# Patient Record
Sex: MALE | Race: BLACK OR AFRICAN AMERICAN | Employment: FULL TIME | URBAN - METROPOLITAN AREA
[De-identification: names, ages, dates, MRNs, and addresses within clinical notes are randomized per-mention and may not be internally consistent; named-entity substitution may affect disease eponyms.]

---

## 2019-09-24 PROBLEM — E66.01 OBESITY, MORBID (HCC): Status: ACTIVE | Noted: 2019-09-24

## 2020-07-09 ENCOUNTER — HOSPITAL ENCOUNTER (INPATIENT)
Age: 30
LOS: 2 days | Discharge: HOME | DRG: 871 | End: 2020-07-11
Attending: INTERNAL MEDICINE | Admitting: INTERNAL MEDICINE
Payer: COMMERCIAL

## 2020-07-09 ENCOUNTER — HOSPITAL ENCOUNTER (EMERGENCY)
Age: 30
Discharge: ANOTHER HOSPITAL | End: 2020-07-09
Attending: EMERGENCY MEDICINE
Payer: COMMERCIAL

## 2020-07-09 ENCOUNTER — APPOINTMENT (OUTPATIENT)
Dept: GENERAL RADIOLOGY | Age: 30
End: 2020-07-09
Attending: EMERGENCY MEDICINE
Payer: COMMERCIAL

## 2020-07-09 VITALS
DIASTOLIC BLOOD PRESSURE: 81 MMHG | RESPIRATION RATE: 30 BRPM | HEART RATE: 114 BPM | BODY MASS INDEX: 43.16 KG/M2 | WEIGHT: 275 LBS | HEIGHT: 67 IN | SYSTOLIC BLOOD PRESSURE: 138 MMHG | OXYGEN SATURATION: 93 % | TEMPERATURE: 99.9 F

## 2020-07-09 DIAGNOSIS — U07.1 COVID-19: Primary | ICD-10-CM

## 2020-07-09 PROBLEM — A41.9 SEPSIS (HCC): Status: ACTIVE | Noted: 2020-07-09

## 2020-07-09 PROBLEM — E11.9 DM (DIABETES MELLITUS) (HCC): Status: ACTIVE | Noted: 2020-07-09

## 2020-07-09 PROBLEM — R07.89 ATYPICAL CHEST PAIN: Status: ACTIVE | Noted: 2020-07-09

## 2020-07-09 PROBLEM — R00.0 SINUS TACHYCARDIA: Status: ACTIVE | Noted: 2020-07-09

## 2020-07-09 PROBLEM — I10 HTN (HYPERTENSION): Status: ACTIVE | Noted: 2020-07-09

## 2020-07-09 PROBLEM — J12.82 PNEUMONIA DUE TO COVID-19 VIRUS: Status: ACTIVE | Noted: 2020-07-09

## 2020-07-09 LAB
ABO + RH BLD: NORMAL
ALBUMIN SERPL-MCNC: 3.4 G/DL (ref 3.5–5)
ALBUMIN/GLOB SERPL: 0.8 {RATIO} (ref 1.2–3.5)
ALP SERPL-CCNC: 57 U/L (ref 50–136)
ALT SERPL-CCNC: 31 U/L (ref 12–65)
ANION GAP SERPL CALC-SCNC: 7 MMOL/L (ref 7–16)
AST SERPL-CCNC: 27 U/L (ref 15–37)
ATRIAL RATE: 107 BPM
BACTERIA URNS QL MICRO: 0 /HPF
BASOPHILS # BLD: 0 K/UL (ref 0–0.2)
BASOPHILS NFR BLD: 0 % (ref 0–2)
BILIRUB SERPL-MCNC: 0.8 MG/DL (ref 0.2–1.1)
BLOOD GROUP ANTIBODIES SERPL: NORMAL
BUN SERPL-MCNC: 13 MG/DL (ref 6–23)
CALCIUM SERPL-MCNC: 8.6 MG/DL (ref 8.3–10.4)
CALCULATED P AXIS, ECG09: 36 DEGREES
CALCULATED R AXIS, ECG10: 121 DEGREES
CALCULATED T AXIS, ECG11: 42 DEGREES
CASTS URNS QL MICRO: 0 /LPF
CHLORIDE SERPL-SCNC: 102 MMOL/L (ref 98–107)
CO2 SERPL-SCNC: 26 MMOL/L (ref 21–32)
CREAT SERPL-MCNC: 1.12 MG/DL (ref 0.8–1.5)
DIAGNOSIS, 93000: NORMAL
DIFFERENTIAL METHOD BLD: ABNORMAL
EOSINOPHIL # BLD: 0 K/UL (ref 0–0.8)
EOSINOPHIL NFR BLD: 0 % (ref 0.5–7.8)
EPI CELLS #/AREA URNS HPF: NORMAL /HPF
ERYTHROCYTE [DISTWIDTH] IN BLOOD BY AUTOMATED COUNT: 13.6 % (ref 11.9–14.6)
GLOBULIN SER CALC-MCNC: 4.4 G/DL (ref 2.3–3.5)
GLUCOSE BLD STRIP.AUTO-MCNC: 134 MG/DL (ref 65–100)
GLUCOSE BLD STRIP.AUTO-MCNC: 175 MG/DL (ref 65–100)
GLUCOSE SERPL-MCNC: 112 MG/DL (ref 65–100)
HCT VFR BLD AUTO: 48.1 % (ref 41.1–50.3)
HGB BLD-MCNC: 15.9 G/DL (ref 13.6–17.2)
IMM GRANULOCYTES # BLD AUTO: 0 K/UL (ref 0–0.5)
IMM GRANULOCYTES NFR BLD AUTO: 0 % (ref 0–5)
LACTATE SERPL-SCNC: 1 MMOL/L (ref 0.4–2)
LYMPHOCYTES # BLD: 1.3 K/UL (ref 0.5–4.6)
LYMPHOCYTES NFR BLD: 26 % (ref 13–44)
MCH RBC QN AUTO: 27.8 PG (ref 26.1–32.9)
MCHC RBC AUTO-ENTMCNC: 33.1 G/DL (ref 31.4–35)
MCV RBC AUTO: 84.1 FL (ref 79.6–97.8)
MONOCYTES # BLD: 0.3 K/UL (ref 0.1–1.3)
MONOCYTES NFR BLD: 7 % (ref 4–12)
NEUTS SEG # BLD: 3.4 K/UL (ref 1.7–8.2)
NEUTS SEG NFR BLD: 67 % (ref 43–78)
NRBC # BLD: 0 K/UL (ref 0–0.2)
P-R INTERVAL, ECG05: 150 MS
PLATELET # BLD AUTO: 164 K/UL (ref 150–450)
PMV BLD AUTO: 10.5 FL (ref 9.4–12.3)
POTASSIUM SERPL-SCNC: 4 MMOL/L (ref 3.5–5.1)
PROT SERPL-MCNC: 7.8 G/DL (ref 6.3–8.2)
Q-T INTERVAL, ECG07: 318 MS
QRS DURATION, ECG06: 80 MS
QTC CALCULATION (BEZET), ECG08: 424 MS
RBC # BLD AUTO: 5.72 M/UL (ref 4.23–5.6)
RBC #/AREA URNS HPF: NORMAL /HPF
SODIUM SERPL-SCNC: 135 MMOL/L (ref 136–145)
SPECIMEN EXP DATE BLD: NORMAL
VENTRICULAR RATE, ECG03: 107 BPM
WBC # BLD AUTO: 5.1 K/UL (ref 4.3–11.1)
WBC URNS QL MICRO: NORMAL /HPF

## 2020-07-09 PROCEDURE — 80053 COMPREHEN METABOLIC PANEL: CPT

## 2020-07-09 PROCEDURE — 87040 BLOOD CULTURE FOR BACTERIA: CPT

## 2020-07-09 PROCEDURE — 74011636637 HC RX REV CODE- 636/637: Performed by: INTERNAL MEDICINE

## 2020-07-09 PROCEDURE — 71045 X-RAY EXAM CHEST 1 VIEW: CPT

## 2020-07-09 PROCEDURE — 74011000258 HC RX REV CODE- 258: Performed by: INTERNAL MEDICINE

## 2020-07-09 PROCEDURE — 82962 GLUCOSE BLOOD TEST: CPT

## 2020-07-09 PROCEDURE — 30233K1 TRANSFUSION OF NONAUTOLOGOUS FROZEN PLASMA INTO PERIPHERAL VEIN, PERCUTANEOUS APPROACH: ICD-10-PCS | Performed by: INTERNAL MEDICINE

## 2020-07-09 PROCEDURE — 65270000029 HC RM PRIVATE

## 2020-07-09 PROCEDURE — 74011250636 HC RX REV CODE- 250/636: Performed by: INTERNAL MEDICINE

## 2020-07-09 PROCEDURE — 83605 ASSAY OF LACTIC ACID: CPT

## 2020-07-09 PROCEDURE — 74011250637 HC RX REV CODE- 250/637: Performed by: EMERGENCY MEDICINE

## 2020-07-09 PROCEDURE — 96375 TX/PRO/DX INJ NEW DRUG ADDON: CPT

## 2020-07-09 PROCEDURE — 74011250637 HC RX REV CODE- 250/637: Performed by: INTERNAL MEDICINE

## 2020-07-09 PROCEDURE — 99285 EMERGENCY DEPT VISIT HI MDM: CPT

## 2020-07-09 PROCEDURE — 74011000258 HC RX REV CODE- 258: Performed by: EMERGENCY MEDICINE

## 2020-07-09 PROCEDURE — 86900 BLOOD TYPING SEROLOGIC ABO: CPT

## 2020-07-09 PROCEDURE — 93005 ELECTROCARDIOGRAM TRACING: CPT | Performed by: EMERGENCY MEDICINE

## 2020-07-09 PROCEDURE — 74011250637 HC RX REV CODE- 250/637: Performed by: FAMILY MEDICINE

## 2020-07-09 PROCEDURE — 96365 THER/PROPH/DIAG IV INF INIT: CPT

## 2020-07-09 PROCEDURE — 36415 COLL VENOUS BLD VENIPUNCTURE: CPT

## 2020-07-09 PROCEDURE — 85025 COMPLETE CBC W/AUTO DIFF WBC: CPT

## 2020-07-09 PROCEDURE — 74011250636 HC RX REV CODE- 250/636: Performed by: EMERGENCY MEDICINE

## 2020-07-09 PROCEDURE — 81015 MICROSCOPIC EXAM OF URINE: CPT

## 2020-07-09 RX ORDER — INSULIN LISPRO 100 [IU]/ML
INJECTION, SOLUTION INTRAVENOUS; SUBCUTANEOUS
Status: DISCONTINUED | OUTPATIENT
Start: 2020-07-09 | End: 2020-07-11 | Stop reason: HOSPADM

## 2020-07-09 RX ORDER — INSULIN LISPRO 100 [IU]/ML
INJECTION, SOLUTION INTRAVENOUS; SUBCUTANEOUS
Status: CANCELLED | OUTPATIENT
Start: 2020-07-09

## 2020-07-09 RX ORDER — SODIUM CHLORIDE 0.9 % (FLUSH) 0.9 %
5-40 SYRINGE (ML) INJECTION AS NEEDED
Status: CANCELLED | OUTPATIENT
Start: 2020-07-09

## 2020-07-09 RX ORDER — SODIUM CHLORIDE 9 MG/ML
250 INJECTION, SOLUTION INTRAVENOUS AS NEEDED
Status: DISCONTINUED | OUTPATIENT
Start: 2020-07-09 | End: 2020-07-11 | Stop reason: HOSPADM

## 2020-07-09 RX ORDER — ONDANSETRON 2 MG/ML
4 INJECTION INTRAMUSCULAR; INTRAVENOUS
Status: DISCONTINUED | OUTPATIENT
Start: 2020-07-09 | End: 2020-07-11 | Stop reason: HOSPADM

## 2020-07-09 RX ORDER — AZITHROMYCIN 250 MG/1
250 TABLET, FILM COATED ORAL DAILY
Status: DISCONTINUED | OUTPATIENT
Start: 2020-07-10 | End: 2020-07-10

## 2020-07-09 RX ORDER — SODIUM CHLORIDE 0.9 % (FLUSH) 0.9 %
5-40 SYRINGE (ML) INJECTION AS NEEDED
Status: DISCONTINUED | OUTPATIENT
Start: 2020-07-09 | End: 2020-07-09 | Stop reason: HOSPADM

## 2020-07-09 RX ORDER — LOSARTAN POTASSIUM 25 MG/1
25 TABLET ORAL DAILY
Status: CANCELLED | OUTPATIENT
Start: 2020-07-10

## 2020-07-09 RX ORDER — ACETAMINOPHEN 500 MG
1000 TABLET ORAL
Status: COMPLETED | OUTPATIENT
Start: 2020-07-09 | End: 2020-07-09

## 2020-07-09 RX ORDER — SODIUM CHLORIDE 0.9 % (FLUSH) 0.9 %
5-40 SYRINGE (ML) INJECTION EVERY 8 HOURS
Status: CANCELLED | OUTPATIENT
Start: 2020-07-09

## 2020-07-09 RX ORDER — ACETAMINOPHEN 325 MG/1
650 TABLET ORAL
Status: DISCONTINUED | OUTPATIENT
Start: 2020-07-09 | End: 2020-07-11 | Stop reason: HOSPADM

## 2020-07-09 RX ORDER — HYDROCHLOROTHIAZIDE 25 MG/1
25 TABLET ORAL DAILY
Status: CANCELLED | OUTPATIENT
Start: 2020-07-10

## 2020-07-09 RX ORDER — PANTOPRAZOLE SODIUM 40 MG/1
40 TABLET, DELAYED RELEASE ORAL
Status: DISCONTINUED | OUTPATIENT
Start: 2020-07-10 | End: 2020-07-11 | Stop reason: HOSPADM

## 2020-07-09 RX ORDER — ENOXAPARIN SODIUM 100 MG/ML
40 INJECTION SUBCUTANEOUS EVERY 12 HOURS
Status: DISCONTINUED | OUTPATIENT
Start: 2020-07-09 | End: 2020-07-11 | Stop reason: HOSPADM

## 2020-07-09 RX ORDER — DEXAMETHASONE SODIUM PHOSPHATE 100 MG/10ML
6 INJECTION INTRAMUSCULAR; INTRAVENOUS DAILY
Status: CANCELLED | OUTPATIENT
Start: 2020-07-10 | End: 2020-07-19

## 2020-07-09 RX ORDER — ONDANSETRON 2 MG/ML
4 INJECTION INTRAMUSCULAR; INTRAVENOUS
Status: CANCELLED | OUTPATIENT
Start: 2020-07-09

## 2020-07-09 RX ORDER — AZITHROMYCIN 250 MG/1
500 TABLET, FILM COATED ORAL DAILY
Status: CANCELLED | OUTPATIENT
Start: 2020-07-10

## 2020-07-09 RX ORDER — PANTOPRAZOLE SODIUM 40 MG/1
40 TABLET, DELAYED RELEASE ORAL
Status: CANCELLED | OUTPATIENT
Start: 2020-07-10

## 2020-07-09 RX ORDER — SODIUM CHLORIDE 9 MG/ML
250 INJECTION, SOLUTION INTRAVENOUS AS NEEDED
Status: CANCELLED | OUTPATIENT
Start: 2020-07-09

## 2020-07-09 RX ORDER — PANTOPRAZOLE SODIUM 40 MG/1
40 TABLET, DELAYED RELEASE ORAL
Status: DISCONTINUED | OUTPATIENT
Start: 2020-07-10 | End: 2020-07-09 | Stop reason: HOSPADM

## 2020-07-09 RX ORDER — DEXAMETHASONE SODIUM PHOSPHATE 100 MG/10ML
6 INJECTION INTRAMUSCULAR; INTRAVENOUS DAILY
Status: DISCONTINUED | OUTPATIENT
Start: 2020-07-09 | End: 2020-07-09 | Stop reason: HOSPADM

## 2020-07-09 RX ORDER — GUAIFENESIN 600 MG/1
600 TABLET, EXTENDED RELEASE ORAL EVERY 12 HOURS
Status: DISCONTINUED | OUTPATIENT
Start: 2020-07-09 | End: 2020-07-11 | Stop reason: HOSPADM

## 2020-07-09 RX ORDER — SODIUM CHLORIDE 0.9 % (FLUSH) 0.9 %
5-40 SYRINGE (ML) INJECTION EVERY 8 HOURS
Status: DISCONTINUED | OUTPATIENT
Start: 2020-07-09 | End: 2020-07-11 | Stop reason: HOSPADM

## 2020-07-09 RX ORDER — METFORMIN HYDROCHLORIDE 500 MG/1
500 TABLET ORAL
COMMUNITY
End: 2021-03-04 | Stop reason: SDUPTHER

## 2020-07-09 RX ORDER — SODIUM CHLORIDE 0.9 % (FLUSH) 0.9 %
5-40 SYRINGE (ML) INJECTION EVERY 8 HOURS
Status: DISCONTINUED | OUTPATIENT
Start: 2020-07-09 | End: 2020-07-09 | Stop reason: HOSPADM

## 2020-07-09 RX ORDER — DEXAMETHASONE SODIUM PHOSPHATE 100 MG/10ML
6 INJECTION INTRAMUSCULAR; INTRAVENOUS DAILY
Status: DISCONTINUED | OUTPATIENT
Start: 2020-07-10 | End: 2020-07-11 | Stop reason: DRUGHIGH

## 2020-07-09 RX ORDER — ENOXAPARIN SODIUM 100 MG/ML
40 INJECTION SUBCUTANEOUS EVERY 24 HOURS
Status: CANCELLED | OUTPATIENT
Start: 2020-07-09

## 2020-07-09 RX ORDER — SODIUM CHLORIDE 0.9 % (FLUSH) 0.9 %
5-40 SYRINGE (ML) INJECTION AS NEEDED
Status: DISCONTINUED | OUTPATIENT
Start: 2020-07-09 | End: 2020-07-11 | Stop reason: HOSPADM

## 2020-07-09 RX ORDER — HYDROCODONE BITARTRATE AND HOMATROPINE METHYLBROMIDE 1.5; 5 MG/5ML; MG/5ML
5 SYRUP ORAL
Status: DISCONTINUED | OUTPATIENT
Start: 2020-07-09 | End: 2020-07-11 | Stop reason: HOSPADM

## 2020-07-09 RX ADMIN — INSULIN LISPRO 2 UNITS: 100 INJECTION, SOLUTION INTRAVENOUS; SUBCUTANEOUS at 22:23

## 2020-07-09 RX ADMIN — ACETAMINOPHEN 650 MG: 325 TABLET, FILM COATED ORAL at 22:23

## 2020-07-09 RX ADMIN — ENOXAPARIN SODIUM 40 MG: 40 INJECTION SUBCUTANEOUS at 14:18

## 2020-07-09 RX ADMIN — CEFTRIAXONE 1 G: 1 INJECTION, POWDER, FOR SOLUTION INTRAMUSCULAR; INTRAVENOUS at 10:18

## 2020-07-09 RX ADMIN — CEFTRIAXONE SODIUM 1 G: 1 INJECTION, POWDER, FOR SOLUTION INTRAMUSCULAR; INTRAVENOUS at 14:18

## 2020-07-09 RX ADMIN — Medication 5 ML: at 23:51

## 2020-07-09 RX ADMIN — HYDROCODONE BITARTRATE AND HOMATROPINE METHYLBROMIDE 5 ML: 5; 1.5 SOLUTION ORAL at 23:51

## 2020-07-09 RX ADMIN — AZITHROMYCIN MONOHYDRATE 500 MG: 500 INJECTION, POWDER, LYOPHILIZED, FOR SOLUTION INTRAVENOUS at 10:24

## 2020-07-09 RX ADMIN — Medication 10 ML: at 14:18

## 2020-07-09 RX ADMIN — ACETAMINOPHEN 1000 MG: 500 TABLET, FILM COATED ORAL at 10:17

## 2020-07-09 RX ADMIN — DEXAMETHASONE SODIUM PHOSPHATE 6 MG: 10 INJECTION INTRAMUSCULAR; INTRAVENOUS at 10:39

## 2020-07-09 NOTE — DISCHARGE SUMMARY
Hospitalist Discharge Summary     Patient ID:  Jayde Marshall  [id number]  6 y.o.  1990  Admit date: 7/9/2020  8:57 AM  Discharge date and time: 7/9/2020  Attending: Ani Doyle MD  PCP:  Fadia Rees NP  Treatment Team: Attending Provider: Ani Doyle MD; Primary Nurse: Nelson Hatfield RN    Principal Diagnosis <principal problem not specified>   Active Problems:    HTN (hypertension) (7/9/2020)      DM (diabetes mellitus) ([address] (7/9/2020)      COVID-19 (7/9/2020)      Sinus tachycardia (7/9/2020)      Atypical chest pain (7/9/2020)      Sepsis ([address] (7/9/2020)       31 yo AAM with past history of HTN and NIDDM type II presents with worsening shortness of breath and fevers/chills that started on Tuesday. He was recently diagnosed with COVID-19 on July 3 at [facility]. He has not had these current symptoms when he was first diagnosed. He does have some chest pain \"but it's mainly from coughing. \" He does have shortness of breath that is worse when he moves around. He denies abdominal pain, nausea/vomiting, or diarrhea. Patient works as a [employer]  and says that several of his co-workers have also developed COVID-19.     ED Course: patient with HRs ranging from 130-150, exacerbated by marching in place with drop in O2 sats to upper 80s on RA when moving around. Blood cultures collected and given empiric Rocephin/azithro for CAP coverage. Hospitalist consulted for admission. Hospital Course:  Please refer to the admission H&P for details of presentation. In summary, the patient is being admitted for sepsis secondary to COVID-19. He will be transferred DT to [facility]. Please see H&P for further details.      Significant Diagnostic Studies:       Labs: Results:       Chemistry Recent Labs     07/09/20  1009   *   *   K 4.0      CO2 26   BUN 13   CREA 1.12   CA 8.6   AGAP 7   AP 57   TP 7.8   ALB 3.4*   GLOB 4.4*   AGRAT 0.8*      CBC w/Diff Recent Labs 07/09/20  1009   WBC 5.1   RBC 5.72*   HGB 15.9   HCT 48.1      GRANS 67   LYMPH 26   EOS 0*      Cardiac Enzymes No results for input(s): CPK, CKND1, OLEG in the last 72 hours. No lab exists for component: CKRMB, TROIP   Coagulation No results for input(s): PTP, INR, APTT, INREXT in the last 72 hours. Lipid Panel No results found for: CHOL, CHOLPOCT, CHOLX, CHLST, CHOLV, 253440, HDL, HDLP, LDL, LDLC, DLDLP, 636605, VLDLC, VLDL, TGLX, TRIGL, TRIGP, TGLPOCT, CHHD, CHHDX   BNP No results for input(s): BNPP in the last 72 hours. Liver Enzymes Recent Labs     07/09/20  1009   TP 7.8   ALB 3.4*   AP 57      Thyroid Studies Lab Results   Component Value Date/Time    TSH 0.961 09/24/2019 10:25 AM            Discharge Exam:  Visit Vitals  BP (!) 169/99 (BP 1 Location: Right arm, BP Patient Position: At rest)   Pulse (!) 120   Temp (!) 100.9 °F (38.3 °C)   Resp 26   Ht 5' 7\" (1.702 m)   Wt 124.7 kg (275 lb)   SpO2 92%   BMI 43.07 kg/m²     General:          Alert, cooperative, no distress, appears stated age. Head:               Normocephalic, without obvious abnormality, atraumatic. Nose:               Nares normal. No drainage or sinus tenderness. Lungs:             Coarse breath sounds with observed nonproductive cough, not visibly tachypneic or dyspneic  Heart:              Tachycardic rate,  no murmur, rub or gallop. Abdomen:        Soft, non-tender. Not distended. Bowel sounds normal.   Extremities:     No cyanosis. No edema. No clubbing  Skin:                Texture, turgor normal. No rashes or lesions. Not Jaundiced  Neurologic:      Alert and oriented x 3, no focal deficits     Disposition: transfer to [facility]  Discharge Condition: stable  Patient Instructions:   Current Discharge Medication List          Activity: Activity as tolerated  Diet: Diabetic Diet  Wound Care: None needed    Follow-up  ·   Transfer to DT Room 817  Time spent to discharge patient 35 minutes  Signed:   Kasandra Wood DO  7/9/2020  11:23 AM

## 2020-07-09 NOTE — H&P
HOSPITALIST H&P/CONSULT  NAME:  Michael Saunders   Age:  30 y.o.  :   1990   MRN:   [id number]  PCP: Rickie Ramirez NP  Consulting MD:  Treatment Team: Attending Provider: Iam Lewis MD  HPI:   29 yo AAM with past history of HTN and NIDDM type II presents with worsening shortness of breath and fevers/chills that started on Tuesday. He was recently diagnosed with COVID-19 on July 3 at [facility]. He has not had these current symptoms when he was first diagnosed. He does have some chest pain \"but it's mainly from coughing. \" He does have shortness of breath that is worse when he moves around. He denies abdominal pain, nausea/vomiting, or diarrhea. Patient works as a [employer]  and says that several of his co-workers have also developed COVID-23. ED Course: patient with HRs ranging from 130-150, exacerbated by marching in place with drop in O2 sats to upper 80s on RA when moving around. Blood cultures collected and given empiric Rocephin/azithro for CAP coverage. Hospitalist consulted for admission. Complete ROS done and is as stated in HPI or otherwise negative  Past Medical History:   Diagnosis Date    DM (diabetes mellitus) ([place]) 2020    Headache     Hypertension       No past surgical history on file. Prior to Admission Medications   Prescriptions Last Dose Informant Patient Reported? Taking? Blood-Glucose Meter monitoring kit   No No   Sig: Test blood sugar two times a day. atenoloL (TENORMIN) 50 mg tablet   No No   Sig: Take 1 Tab by mouth daily as needed for Pain. butalbital-aspirin-caffeine (FIORINAL) capsule   Yes No   Sig: Take 1 Cap by mouth every four (4) hours as needed for Pain.   glucose blood VI test strips (ASCENSIA AUTODISC VI, ONE TOUCH ULTRA TEST VI) strip   No No   Sig: Test blood sugar two times a day. hydroCHLOROthiazide (HYDRODIURIL) 25 mg tablet   No No   Sig: Take 1 Tab by mouth daily.    irbesartan (AVAPRO) 150 mg tablet   No No Sig: Take 1 Tab by mouth nightly. lancets misc   No No   Sig: Test blood sugar two times a day. Facility-Administered Medications: None     No Known Allergies   Social History     Tobacco Use    Smoking status: Never Smoker    Smokeless tobacco: Never Used   Substance Use Topics    Alcohol use: Yes      Family History   Problem Relation Age of Onset    No Known Problems Mother     No Known Problems Father       Objective: There were no vitals taken for this visit. Temp (24hrs), Av.9 °F (38.3 °C), Min:100.9 °F (38.3 °C), Max:100.9 °F (38.3 °C)       Physical Exam:  General:    Alert, cooperative, no distress, appears stated age. Head:   Normocephalic, without obvious abnormality, atraumatic. Nose:  Nares normal. No drainage or sinus tenderness. Lungs:   Coarse breath sounds with observed nonproductive cough, not visibly tachypneic or dyspneic  Heart:   Tachycardic rate,  no murmur, rub or gallop. Abdomen:   Soft, non-tender. Not distended. Bowel sounds normal.   Extremities: No cyanosis. No edema. No clubbing  Skin:     Texture, turgor normal. No rashes or lesions. Not Jaundiced  Neurologic: Alert and oriented x 3, no focal deficits   Data Review:   Recent Results (from the past 24 hour(s))   CBC WITH AUTOMATED DIFF    Collection Time: 20 10:09 AM   Result Value Ref Range    WBC 5.1 4.3 - 11.1 K/uL    RBC 5.72 (H) 4.23 - 5.6 M/uL    HGB 15.9 13.6 - 17.2 g/dL    HCT 48.1 41.1 - 50.3 %    MCV 84.1 79.6 - 97.8 FL    MCH 27.8 26.1 - 32.9 PG    MCHC 33.1 31.4 - 35.0 g/dL    RDW 13.6 11.9 - 14.6 %    PLATELET 320 401 - 696 K/uL    MPV 10.5 9.4 - 12.3 FL    ABSOLUTE NRBC 0.00 0.0 - 0.2 K/uL    DF AUTOMATED      NEUTROPHILS 67 43 - 78 %    LYMPHOCYTES 26 13 - 44 %    MONOCYTES 7 4.0 - 12.0 %    EOSINOPHILS 0 (L) 0.5 - 7.8 %    BASOPHILS 0 0.0 - 2.0 %    IMMATURE GRANULOCYTES 0 0.0 - 5.0 %    ABS. NEUTROPHILS 3.4 1.7 - 8.2 K/UL    ABS. LYMPHOCYTES 1.3 0.5 - 4.6 K/UL    ABS.  MONOCYTES 0.3 0.1 - 1.3 K/UL    ABS. EOSINOPHILS 0.0 0.0 - 0.8 K/UL    ABS. BASOPHILS 0.0 0.0 - 0.2 K/UL    ABS. IMM. GRANS. 0.0 0.0 - 0.5 K/UL   METABOLIC PANEL, COMPREHENSIVE    Collection Time: 07/09/20 10:09 AM   Result Value Ref Range    Sodium 135 (L) 136 - 145 mmol/L    Potassium 4.0 3.5 - 5.1 mmol/L    Chloride 102 98 - 107 mmol/L    CO2 26 21 - 32 mmol/L    Anion gap 7 7 - 16 mmol/L    Glucose 112 (H) 65 - 100 mg/dL    BUN 13 6 - 23 MG/DL    Creatinine 1.12 0.8 - 1.5 MG/DL    GFR est AA >60 >60 ml/min/1.73m2    GFR est non-AA >60 >60 ml/min/1.73m2    Calcium 8.6 8.3 - 10.4 MG/DL    Bilirubin, total 0.8 0.2 - 1.1 MG/DL    ALT (SGPT) 31 12 - 65 U/L    AST (SGOT) 27 15 - 37 U/L    Alk. phosphatase 57 50 - 136 U/L    Protein, total 7.8 6.3 - 8.2 g/dL    Albumin 3.4 (L) 3.5 - 5.0 g/dL    Globulin 4.4 (H) 2.3 - 3.5 g/dL    A-G Ratio 0.8 (L) 1.2 - 3.5     LACTIC ACID    Collection Time: 07/09/20 10:09 AM   Result Value Ref Range    Lactic acid 1.0 0.4 - 2.0 MMOL/L   URINE MICROSCOPIC    Collection Time: 07/09/20 10:35 AM   Result Value Ref Range    WBC 3-5 0 /hpf    RBC 0-3 0 /hpf    Epithelial cells 0-3 0 /hpf    Bacteria 0 0 /hpf    Casts 0 0 /lpf     Imaging /Procedures /Studies     Assessment and Plan:     There are no active hospital problems to display for this patient.       PLAN    # Sepsis secondary to COVID-19  - blood cultures collected  - given Rocephin/azithromycin empirically, will continue for now  - type/screen completed, will order convalescent plasma after transfer DT  - not on supplemental oxygen currently, not a candidate for Remdisivir   - will start on Decadron 6 mg IV qdaily    # DM type II  - hold home metformin  - Humalog SSI and serial CBGs    # Sinus tachycardia  - due to above  - supportive management    # Atypical chest pain  - likely MSK in etiology due to coughing    F/E/N: no fluids, replete electrolytes as needed, diabetic diet    Ppx: Lovenox for VTE    Code Status: FULL CODE    Disposition: admit to Inpatient, transfer from [address] to . Discussed with patient at bedside. All questions answered.      Signed By: Kimberly Arias,      July 9, 2020

## 2020-07-09 NOTE — ED PROVIDER NOTES
Patient has a history of hypertension, states 8 days ago he started having a headache with fevers and body aches. He was tested at [address] and was COVID positive. He states that for the past several days, he has had a cough and shortness of breath. He continues to run a fever for which he is taking over-the-counter antipyretics. He also has been having diarrhea, denies any nausea or vomiting. He came in today because his dyspnea and cough is worsening. Past Medical History:   Diagnosis Date    Headache     Hypertension        No past surgical history on file. Family History:   Problem Relation Age of Onset    No Known Problems Mother     No Known Problems Father        Social History     Socioeconomic History    Marital status: SINGLE     Spouse name: Not on file    Number of children: Not on file    Years of education: Not on file    Highest education level: Not on file   Occupational History    Not on file   Social Needs    Financial resource strain: Not on file    Food insecurity     Worry: Not on file     Inability: Not on file    Transportation needs     Medical: Not on file     Non-medical: Not on file   Tobacco Use    Smoking status: Never Smoker    Smokeless tobacco: Never Used   Substance and Sexual Activity    Alcohol use:  Yes    Drug use: Never    Sexual activity: Not Currently   Lifestyle    Physical activity     Days per week: Not on file     Minutes per session: Not on file    Stress: Not on file   Relationships    Social connections     Talks on phone: Not on file     Gets together: Not on file     Attends Protestant service: Not on file     Active member of club or organization: Not on file     Attends meetings of clubs or organizations: Not on file     Relationship status: Not on file    Intimate partner violence     Fear of current or ex partner: Not on file     Emotionally abused: Not on file     Physically abused: Not on file     Forced sexual activity: Not on file   Other Topics Concern    Not on file   Social History Narrative    Not on file         ALLERGIES: Patient has no known allergies. Review of Systems   Constitutional: Positive for chills and fever. Respiratory: Positive for cough and shortness of breath. Gastrointestinal: Positive for diarrhea. Negative for nausea and vomiting. All other systems reviewed and are negative. Vitals:    07/09/20 0904   BP: (!) 169/99   Pulse: (!) 120   Resp: 26   Temp: (!) 100.9 °F (38.3 °C)   SpO2: 92%   Weight: 124.7 kg (275 lb)   Height: 5' 7\" (1.702 m)            Physical Exam  Vitals signs and nursing note reviewed. Constitutional:       Appearance: Normal appearance. He is well-developed. HENT:      Head: Normocephalic and atraumatic. Eyes:      Conjunctiva/sclera: Conjunctivae normal.      Pupils: Pupils are equal, round, and reactive to light. Neck:      Musculoskeletal: Normal range of motion and neck supple. Cardiovascular:      Rate and Rhythm: Regular rhythm. Tachycardia present. Pulmonary:      Effort: Pulmonary effort is normal. No respiratory distress. Breath sounds: Normal breath sounds. No wheezing or rales. Musculoskeletal: Normal range of motion. Right lower leg: No edema. Left lower leg: No edema. Skin:     General: Skin is warm and dry. Neurological:      Mental Status: He is alert and oriented to person, place, and time. MDM  Number of Diagnoses or Management Options  COVID-19: new and requires workup  Puerperal sepsis with acute hypoxic respiratory failure, unspecified whether septic shock present [facility]): new and requires workup  Diagnosis management comments: 9:46 AM  Patient is able to march in place for only 30 seconds. His O2 sat drops to 89% and his heart rate gets to the 150s    11:33 AM  Discussed results with patient, need for admission. He is agreeable. Spoke with the hospitalist, to see patient for admission.        Amount and/or Complexity of Data Reviewed  Clinical lab tests: ordered and reviewed  Tests in the radiology section of CPT®: ordered and reviewed  Tests in the medicine section of CPT®: ordered and reviewed  Discuss the patient with other providers: yes    Risk of Complications, Morbidity, and/or Mortality  Presenting problems: high  Diagnostic procedures: moderate  Management options: moderate    Patient Progress  Patient progress: improved         Procedures

## 2020-07-09 NOTE — ED TRIAGE NOTES
Patient arrives with complaint of shortness of breath. He tested last week for Covid. He received a positive result this past Friday (8 days ago). Patient reports that the shortness of breath started this week and has progressively gotten worse. He reports that he has pain every time he breathes.

## 2020-07-09 NOTE — CONSULTS
Infectious Disease Non-face to Face Encounter    Today's date: 7/9/2020  Admit date: 7/9/2020    Impression:     1. COVID-19 infection: test positive 7/2/20.   2. Acute hypoxic respiratory failure; not currently requiring continuous oxygen support. Recommendations:      Check baseline ABO compatibility   o If convalescent serum is appropriate, please consult heme/onc  · Since  He is currently not requiring oxygen supplementation, he does not qualify for Remdisivir by [facility] criteria. Can reconsider if situation changes. · Immediate contraindications to [facility] are LFTs > 5x ULN, CrCl < 30 or ESRD without possibility of transplant, MELD > 20 without possibility of transplant, end stage COPD or CHF, metastatic malignancy, no reasonable expectation of survival > 1 year, intubated with FiO2 > 60%  · Please provide patient with FDA EUA fact sheet (links below) PRIOR to initiation of medication  · Pharmacy information for filling out [facility] request below  · Ordering provider will list their name and contact information - this will ONLY be used by [facility] to notify you of either medication approval or denial  · If approved, the submitting provider will receive an email confirming approval; following this, the order for remdesivir may be entered. Please place it under the ID physician on call's name  · If approved, the order is 'non-formulary, remdesivir': 200mg IV x 1 followed by 100mg IV q24h x 4 days  · Please monitor daily CMP while on RDV; if they develop URIAH, RDV should be discontinued  · Adverse events should be submitted through [email or web address]  · [facility] can be contacted at [email or web address] for any issues after submitting a request  · [facility] is not accepting any returned drug so if drug is left over because patient discharged, had adverse events, etc., it can be reallocated - please contact [facility] at the email address above   Recommend DVT prophylaxis as COVID-19 is a hypercoagulable state.     Can start Decadron 6 mg IV daily for up to 10 day given potential for furtehr deterioration; if this does not occur then stop steroids   Recommend screening for HIV and viral hepatitis   Monitoring:  o At least q 48 hour CBC with diff, CMP  o Aggressive pulse Ox monitoring  o Conservative fluid strategy - would not give empiric fluid resuscitation unless signs of volume depletion (hypotension, URIAH, etc) are present  o Consider self proning patient    [facility] Remdesivir request form - [email or web address]  Fact Sheet for Patients And Parent/Caregivers   Fact Sheets for Patients and Parent/Caregivers - Serbian    Pharmacy address:  [address] [place] [address]    Primary contact: [email or web address]  [phone]    Secondary contact: [email or web address]  [phone]    Pharmacy permit # 2017 exp 0/97/2499    Anti-Infectives:      CTX/Azithro 7/9-    Clinical Synopsis:     Dr. Tapan Oconnor requesting ID opinion/advice to assist on the care and management of this patient. Patient's chart was reviewed including relevant notes, labs, imaging, medications, pathology, PMH, PSH, FH, and SH. Briefly, patient is a 31 yo [place] American male with history of HTN presented to ED on 7/9 with SOB. Tested positive for COVID 19 at [facility] on 7/2/20. Temp 100.9 on arrival. WBC normal, PLT normal. CRT 1.3: unknown baseline. Normal transaminases. CXR read pending; image below. He is on RA currently; desat to 80 with exertion in ED. Allergies:  No Known Allergies    Anticoagulants:  Key Anti-Platelet Anticoagulant Meds     The patient is on no antiplatelet meds or anticoagulants.            Objective:     Physical Exam:    Vitals:   Current   Temperature: (!) 100.9 °F (38.3 °C)   Heart Rate: (!) 120   Resp Rate: 26   Blood Pressure: (!) 169/99   Oxygen Sats: 92 %         Patient Vitals for the past 12 hrs:   Temp Pulse Resp BP SpO2   07/09/20 1007     92 %   07/09/20 0904 (!) 100.9 °F (38.3 °C) (!) 120 26 (!) 169/99 92 %       No components found for: QTC      Labs:     COVID 19 related labs:  No results found for: CRP, LDH    CBC:  No results found for this visit on 07/09/20 (from the past 12 hour(s)). CMP:  No results found for this visit on 07/09/20 (from the past 12 hour(s)). Microbiology:     All Micro Results     Procedure Component Value Units Date/Time    CULTURE, BLOOD [[id number]]     Order Status:  Sent Specimen:  Blood     CULTURE, BLOOD [[id number]]     Order Status:  Sent Specimen:  Blood           Recent Imaging:         Signed By: [unfilled]     July 9, 2020      Please note, requesting provider was notified via [employer] of the above documentation for his or her review. The patient's status was discussed with the patient's primary provider as well as the patient's primary nurse. ID did not physically enter the patient's room, nor did ID use a remote telehealth monitor, due to facility restrictions on the use of personal protective equipment and the need to preserve personal protective equipment at this time.     Time spent on the above: 15 minutes

## 2020-07-09 NOTE — ED NOTES
TRANSFER - OUT REPORT:    Verbal report given to RN Argenis Thornton on Georganna Days  [phone] for routine progression of care       Report consisted of patients Situation, Background, Assessment and   Recommendations(SBAR). Information from the following report(s) SBAR, ED Summary, Intake/Output, MAR and Cardiac Rhythm Sinus Tachycardia was reviewed with the receiving nurse. Lines:   Peripheral IV 07/09/20 Right Hand (Active)   Site Assessment Clean, dry, & intact 07/09/20 1007   Phlebitis Assessment 0 07/09/20 1007   Infiltration Assessment 0 07/09/20 1007   Dressing Status Clean, dry, & intact 07/09/20 1007   Hub Color/Line Status Blue 07/09/20 1007   Alcohol Cap Used No 07/09/20 1007        Opportunity for questions and clarification was provided.       Patient transported with:   [employer]

## 2020-07-09 NOTE — H&P
HOSPITALIST H&P/CONSULT  NAME:  Laura Quick   Age:  30 y.o.  :   1990   MRN:   [id number]  PCP: Karla Rizzo NP  Consulting MD:  Treatment Team: Attending Provider: Charissa Amaya MD; Primary Nurse: Dell Faria RN  HPI:   31 yo AAM with past history of HTN and NIDDM type II presents with worsening shortness of breath and fevers/chills that started on Tuesday. He was recently diagnosed with COVID-19 on July 3 at [facility]. He has not had these current symptoms when he was first diagnosed. He does have some chest pain \"but it's mainly from coughing. \" He does have shortness of breath that is worse when he moves around. He denies abdominal pain, nausea/vomiting, or diarrhea. 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 ED Course: patient with HRs ranging from 130-150, exacerbated by marching in place with drop in O2 sats to upper 80s on RA when moving around. Blood cultures collected and given empiric Rocephin/azithro for CAP coverage. Hospitalist consulted for admission. Complete ROS done and is as stated in HPI or otherwise negative  Past Medical History:   Diagnosis Date    DM (diabetes mellitus) ([facility] [address] 2020    Headache     Hypertension       History reviewed. No pertinent surgical history. Prior to Admission Medications   Prescriptions Last Dose Informant Patient Reported? Taking? Blood-Glucose Meter monitoring kit   No No   Sig: Test blood sugar two times a day. atenoloL (TENORMIN) 50 mg tablet   No No   Sig: Take 1 Tab by mouth daily as needed for Pain. butalbital-aspirin-caffeine (FIORINAL) capsule   Yes No   Sig: Take 1 Cap by mouth every four (4) hours as needed for Pain.   glucose blood VI test strips (ASCENSIA AUTODISC VI, ONE TOUCH ULTRA TEST VI) strip   No No   Sig: Test blood sugar two times a day. hydroCHLOROthiazide (HYDRODIURIL) 25 mg tablet   No No   Sig: Take 1 Tab by mouth daily. irbesartan (AVAPRO) 150 mg tablet   No No   Sig: Take 1 Tab by mouth nightly. lancets misc   No No   Sig: Test blood sugar two times a day. Facility-Administered Medications: None     No Known Allergies   Social History     Tobacco Use    Smoking status: Never Smoker    Smokeless tobacco: Never Used   Substance Use Topics    Alcohol use: Yes      Family History   Problem Relation Age of Onset    No Known Problems Mother     No Known Problems Father       Objective:     Visit Vitals  BP (!) 169/99 (BP 1 Location: Right arm, BP Patient Position: At rest)   Pulse (!) 120   Temp (!) 100.9 °F (38.3 °C)   Resp 26   Ht 5' 7\" (1.702 m)   Wt 124.7 kg (275 lb)   SpO2 92%   BMI 43.07 kg/m²      Temp (24hrs), Av.9 °F (38.3 °C), Min:100.9 °F (38.3 °C), Max:100.9 °F (38.3 °C)    Oxygen Therapy  O2 Sat (%): 92 % (20 1007)  O2 Device: Room air (20 1007)  Physical Exam:  General:    Alert, cooperative, no distress, appears stated age. Head:   Normocephalic, without obvious abnormality, atraumatic. Nose:  Nares normal. No drainage or sinus tenderness. Lungs:   Coarse breath sounds with observed nonproductive cough, not visibly tachypneic or dyspneic  Heart:   Tachycardic rate,  no murmur, rub or gallop. Abdomen:   Soft, non-tender. Not distended. Bowel sounds normal.   Extremities: No cyanosis. No edema. No clubbing  Skin:     Texture, turgor normal. No rashes or lesions.   Not Jaundiced  Neurologic: Alert and oriented x 3, no focal deficits   Data Review:   Recent Results (from the past 24 hour(s))   CBC WITH AUTOMATED DIFF    Collection Time: 20 10:09 AM   Result Value Ref Range    WBC 5.1 4.3 - 11.1 K/uL    RBC 5.72 (H) 4.23 - 5.6 M/uL    HGB 15.9 13.6 - 17.2 g/dL    HCT 48.1 41.1 - 50.3 %    MCV 84.1 79.6 - 97.8 FL    MCH 27.8 26.1 - 32.9 PG    MCHC 33.1 31.4 - 35.0 g/dL    RDW 13.6 11.9 - 14.6 %    PLATELET 152 425 - 908 K/uL    MPV 10.5 9.4 - 12.3 FL    ABSOLUTE NRBC 0.00 0.0 - 0.2 K/uL    DF AUTOMATED      NEUTROPHILS 67 43 - 78 %    LYMPHOCYTES 26 13 - 44 %    MONOCYTES 7 4.0 - 12.0 %    EOSINOPHILS 0 (L) 0.5 - 7.8 %    BASOPHILS 0 0.0 - 2.0 %    IMMATURE GRANULOCYTES 0 0.0 - 5.0 %    ABS. NEUTROPHILS 3.4 1.7 - 8.2 K/UL    ABS. LYMPHOCYTES 1.3 0.5 - 4.6 K/UL    ABS. MONOCYTES 0.3 0.1 - 1.3 K/UL    ABS. EOSINOPHILS 0.0 0.0 - 0.8 K/UL    ABS. BASOPHILS 0.0 0.0 - 0.2 K/UL    ABS. IMM. GRANS. 0.0 0.0 - 0.5 K/UL   METABOLIC PANEL, COMPREHENSIVE    Collection Time: 07/09/20 10:09 AM   Result Value Ref Range    Sodium 135 (L) 136 - 145 mmol/L    Potassium 4.0 3.5 - 5.1 mmol/L    Chloride 102 98 - 107 mmol/L    CO2 26 21 - 32 mmol/L    Anion gap 7 7 - 16 mmol/L    Glucose 112 (H) 65 - 100 mg/dL    BUN 13 6 - 23 MG/DL    Creatinine 1.12 0.8 - 1.5 MG/DL    GFR est AA >60 >60 ml/min/1.73m2    GFR est non-AA >60 >60 ml/min/1.73m2    Calcium 8.6 8.3 - 10.4 MG/DL    Bilirubin, total 0.8 0.2 - 1.1 MG/DL    ALT (SGPT) 31 12 - 65 U/L    AST (SGOT) 27 15 - 37 U/L    Alk. phosphatase 57 50 - 136 U/L    Protein, total 7.8 6.3 - 8.2 g/dL    Albumin 3.4 (L) 3.5 - 5.0 g/dL    Globulin 4.4 (H) 2.3 - 3.5 g/dL    A-G Ratio 0.8 (L) 1.2 - 3.5     LACTIC ACID    Collection Time: 07/09/20 10:09 AM   Result Value Ref Range    Lactic acid 1.0 0.4 - 2.0 MMOL/L   URINE MICROSCOPIC    Collection Time: 07/09/20 10:35 AM   Result Value Ref Range    WBC 3-5 0 /hpf    RBC 0-3 0 /hpf    Epithelial cells 0-3 0 /hpf    Bacteria 0 0 /hpf    Casts 0 0 /lpf     Imaging /Procedures /Studies     Assessment and Plan:      Active Hospital Problems    Diagnosis Date Noted    HTN (hypertension) 07/09/2020    [facility] 07/09/2020    COVID-19 07/09/2020    Sinus tachycardia 07/09/2020    Atypical chest pain 07/09/2020    Sepsis ([facility] 07/09/2020       PLAN    # Sepsis secondary to COVID-19  - blood cultures collected  - given Rocephin/azithromycin empirically, will continue for now  - type/screen completed, will order convalescent plasma after transfer DT  - not on supplemental oxygen currently, not a candidate for Remdisivir   - will start on Decadron 6 mg IV qdaily    # DM type II  - hold home metformin  - Humalog SSI and serial CBGs    # Sinus tachycardia  - due to above  - supportive management    # Atypical chest pain  - likely MSK in etiology due to coughing    F/E/N: no fluids, replete electrolytes as needed, diabetic diet    Ppx: Lovenox for VTE    Code Status: FULL CODE    Disposition: admit to Inpatient, transfer from [address] to . Discussed with patient at bedside. All questions answered.      Signed By: Gagandeep Nelson DO     July 9, 2020

## 2020-07-09 NOTE — PROGRESS NOTES
TRANSFER - IN REPORT:    Verbal report received from Gordo Mills RN(name) on Michael Cou  being received from [facility] ER(unit) for routine progression of care      Report consisted of patients Situation, Background, Assessment and   Recommendations(SBAR). Information from the following report(s) SBAR, Kardex and MAR was reviewed with the receiving nurse. Opportunity for questions and clarification was provided. Assessment completed upon patients arrival to unit and care assumed.

## 2020-07-09 NOTE — PROGRESS NOTES
07/09/20 1300   Dual Skin Pressure Injury Assessment   Dual Skin Pressure Injury Assessment WDL   Second Care Provider (Based on [address] [place] [address]) Odilon Hatch RN   Skin Integumentary   Skin Integumentary (WDL) WDL    Pressure  Injury Documentation No Pressure Injury Noted-Pressure Ulcer Prevention Initiated   Skin Color Appropriate for ethnicity   Skin Condition/Temp Dry; Warm   Skin Integrity Intact; Piercing(s); Tattoos (comment)

## 2020-07-10 ENCOUNTER — PATIENT OUTREACH (OUTPATIENT)
Dept: CASE MANAGEMENT | Age: 30
End: 2020-07-10

## 2020-07-10 LAB
ANION GAP SERPL CALC-SCNC: 8 MMOL/L (ref 7–16)
BASOPHILS # BLD: 0 K/UL (ref 0–0.2)
BASOPHILS NFR BLD: 0 % (ref 0–2)
BUN SERPL-MCNC: 16 MG/DL (ref 6–23)
CALCIUM SERPL-MCNC: 8.6 MG/DL (ref 8.3–10.4)
CHLORIDE SERPL-SCNC: 101 MMOL/L (ref 98–107)
CO2 SERPL-SCNC: 26 MMOL/L (ref 21–32)
CREAT SERPL-MCNC: 1.07 MG/DL (ref 0.8–1.5)
DIFFERENTIAL METHOD BLD: ABNORMAL
EOSINOPHIL # BLD: 0 K/UL (ref 0–0.8)
EOSINOPHIL NFR BLD: 0 % (ref 0.5–7.8)
ERYTHROCYTE [DISTWIDTH] IN BLOOD BY AUTOMATED COUNT: 13.3 % (ref 11.9–14.6)
GLUCOSE BLD STRIP.AUTO-MCNC: 117 MG/DL (ref 65–100)
GLUCOSE BLD STRIP.AUTO-MCNC: 141 MG/DL (ref 65–100)
GLUCOSE BLD STRIP.AUTO-MCNC: 141 MG/DL (ref 65–100)
GLUCOSE BLD STRIP.AUTO-MCNC: 152 MG/DL (ref 65–100)
GLUCOSE BLD STRIP.AUTO-MCNC: 99 MG/DL (ref 65–100)
GLUCOSE SERPL-MCNC: 137 MG/DL (ref 65–100)
HCT VFR BLD AUTO: 47.9 % (ref 41.1–50.3)
HGB BLD-MCNC: 15.2 G/DL (ref 13.6–17.2)
IMM GRANULOCYTES # BLD AUTO: 0 K/UL (ref 0–0.5)
IMM GRANULOCYTES NFR BLD AUTO: 0 % (ref 0–5)
LYMPHOCYTES # BLD: 1.3 K/UL (ref 0.5–4.6)
LYMPHOCYTES NFR BLD: 29 % (ref 13–44)
MAGNESIUM SERPL-MCNC: 2.5 MG/DL (ref 1.8–2.4)
MCH RBC QN AUTO: 27.3 PG (ref 26.1–32.9)
MCHC RBC AUTO-ENTMCNC: 31.7 G/DL (ref 31.4–35)
MCV RBC AUTO: 86 FL (ref 79.6–97.8)
MONOCYTES # BLD: 0.5 K/UL (ref 0.1–1.3)
MONOCYTES NFR BLD: 10 % (ref 4–12)
NEUTS SEG # BLD: 2.8 K/UL (ref 1.7–8.2)
NEUTS SEG NFR BLD: 61 % (ref 43–78)
NRBC # BLD: 0 K/UL (ref 0–0.2)
PLATELET # BLD AUTO: 190 K/UL (ref 150–450)
PLATELET COMMENTS,PCOM: ADEQUATE
PMV BLD AUTO: 10.1 FL (ref 9.4–12.3)
POTASSIUM SERPL-SCNC: 4.1 MMOL/L (ref 3.5–5.1)
RBC # BLD AUTO: 5.57 M/UL (ref 4.23–5.6)
RBC MORPH BLD: ABNORMAL
SODIUM SERPL-SCNC: 135 MMOL/L (ref 136–145)
WBC # BLD AUTO: 4.6 K/UL (ref 4.3–11.1)
WBC MORPH BLD: ABNORMAL

## 2020-07-10 PROCEDURE — 80048 BASIC METABOLIC PNL TOTAL CA: CPT

## 2020-07-10 PROCEDURE — 74011000258 HC RX REV CODE- 258: Performed by: INTERNAL MEDICINE

## 2020-07-10 PROCEDURE — 36430 TRANSFUSION BLD/BLD COMPNT: CPT

## 2020-07-10 PROCEDURE — 74011250637 HC RX REV CODE- 250/637: Performed by: INTERNAL MEDICINE

## 2020-07-10 PROCEDURE — 74011636637 HC RX REV CODE- 636/637: Performed by: INTERNAL MEDICINE

## 2020-07-10 PROCEDURE — 82962 GLUCOSE BLOOD TEST: CPT

## 2020-07-10 PROCEDURE — 74011250636 HC RX REV CODE- 250/636: Performed by: INTERNAL MEDICINE

## 2020-07-10 PROCEDURE — 83735 ASSAY OF MAGNESIUM: CPT

## 2020-07-10 PROCEDURE — 85025 COMPLETE CBC W/AUTO DIFF WBC: CPT

## 2020-07-10 PROCEDURE — 65270000029 HC RM PRIVATE

## 2020-07-10 PROCEDURE — 74011250637 HC RX REV CODE- 250/637: Performed by: FAMILY MEDICINE

## 2020-07-10 RX ADMIN — ENOXAPARIN SODIUM 40 MG: 40 INJECTION SUBCUTANEOUS at 13:49

## 2020-07-10 RX ADMIN — CEFTRIAXONE 2 G: 2 INJECTION, POWDER, FOR SOLUTION INTRAMUSCULAR; INTRAVENOUS at 09:43

## 2020-07-10 RX ADMIN — HYDROCODONE BITARTRATE AND HOMATROPINE METHYLBROMIDE 5 ML: 5; 1.5 SOLUTION ORAL at 21:04

## 2020-07-10 RX ADMIN — PANTOPRAZOLE SODIUM 40 MG: 40 TABLET, DELAYED RELEASE ORAL at 06:35

## 2020-07-10 RX ADMIN — Medication 5 ML: at 21:03

## 2020-07-10 RX ADMIN — HYDROCODONE BITARTRATE AND HOMATROPINE METHYLBROMIDE 5 ML: 5; 1.5 SOLUTION ORAL at 08:53

## 2020-07-10 RX ADMIN — AZITHROMYCIN MONOHYDRATE 250 MG: 250 TABLET ORAL at 08:54

## 2020-07-10 RX ADMIN — Medication 5 ML: at 06:44

## 2020-07-10 RX ADMIN — ENOXAPARIN SODIUM 40 MG: 40 INJECTION SUBCUTANEOUS at 02:09

## 2020-07-10 RX ADMIN — INSULIN LISPRO 2 UNITS: 100 INJECTION, SOLUTION INTRAVENOUS; SUBCUTANEOUS at 21:26

## 2020-07-10 RX ADMIN — GUAIFENESIN 600 MG: 600 TABLET ORAL at 08:54

## 2020-07-10 RX ADMIN — DEXAMETHASONE SODIUM PHOSPHATE 6 MG: 10 INJECTION INTRAMUSCULAR; INTRAVENOUS at 08:54

## 2020-07-10 RX ADMIN — Medication 10 ML: at 14:00

## 2020-07-10 NOTE — PROGRESS NOTES
Problem: Falls - Risk of  Goal: *Absence of Falls  Description: Document Micha Mckeon Fall Risk and appropriate interventions in the flowsheet. Outcome: Progressing Towards Goal  Note: Fall Risk Interventions:            Medication Interventions: Teach patient to arise slowly                   Problem: Patient Education: Go to Patient Education Activity  Goal: Patient/Family Education  Outcome: Progressing Towards Goal   Patient alert and oriented x 4; ambulates independently. VS stable on room air. O2 sats did drop to 89%. Order for supplemental oxygen received. Patient stated it is very uncomfortable and prefers not to wear it. Repeat vitals O2 was 91%. Advised patient that if it did drop below 90 would strongly advise and encourage oxygen. Awaiting convalescent plasma. Blood bank to call when ready. Patient also refusing mucinex at this time. Says he took it prior to admission and was not helpful. Safety check completed. Call light within reach. Will continue to monitor.

## 2020-07-10 NOTE — PROGRESS NOTES
RNCM received via BRIAN assignment, pt transfer from [facility] to [facility] for pneumonia s/t COVID-19. Will close [facility]AN as pt will be reassigned at [place]. This note will not be viewable in [address]

## 2020-07-10 NOTE — PROGRESS NOTES
Called patient by phone due to covid restrictions  Patient talked with  and was very upbeat  Assured him of our care for him  He was thankful    Told patient if he would like to complete directives while here we will help him  He said ok    Silvino Crouch, staff

## 2020-07-10 NOTE — PROGRESS NOTES
MSN, CM:  Spoke with patient this AM about discharge planning. Patient lives alone in a 2nd floor apartment. Patient is independent with all ADL's and requires no equipment for ambulation. Patient denies any family close by. Patient also denies any home oxygen use at this time, [facility] or rehab in the past.  Patient confirms PCP is THIERRY Bernal and he drives himself to all appointments. Patient denies any needs upon discharge at this time. Case Management will continue to follow for any discharge needs that may arise. Care Management Interventions  PCP Verified by CM: Yes(THIERRY Bernal)  Mode of Transport at Discharge:  Other (see comment)(family to transport)  Transition of Care Consult (CM Consult): Discharge Planning  Current Support Network: Own Home, Lives Alone  Confirm Follow Up Transport: Self  Freedom of Choice List was Provided with Basic Dialogue that Supports the Patient's Individualized Plan of Care/Goals, Treatment Preferences and Shares the Quality Data Associated with the Providers?: Yes  Discharge Location  Discharge Placement: Home

## 2020-07-10 NOTE — DISCHARGE INSTRUCTIONS
Advance Care Planning  People with COVID-19 may have no symptoms, mild symptoms, such as fever, cough, and shortness of breath or they may have more severe illness, developing severe and fatal pneumonia. As a result, Advance Care Planning with attention to naming a health care decision maker (someone you trust to make healthcare decisions for you if you could not speak for yourself) and sharing other health care preferences is important BEFORE a possible health crisis. Please contact your Primary Care Provider to discuss Advance Care Planning. Preventing the Spread of Coronavirus Disease 2019 in Homes and Residential Communities  For the most recent information go to [email or web address]    Prevention steps for People with confirmed or suspected COVID-19 (including persons under investigation) who do not need to be hospitalized  and   People with confirmed COVID-19 who were hospitalized and determined to be medically stable to go home    Your healthcare provider and public health staff will evaluate whether you can be cared for at home. If it is determined that you do not need to be hospitalized and can be isolated at home, you will be monitored by staff from your local or state health department. You should follow the prevention steps below until a healthcare provider or local or state health department says you can return to your normal activities. Stay home except to get medical care  People who are mildly ill with COVID-19 are able to isolate at home during their illness. You should restrict activities outside your home, except for getting medical care. Do not go to work, school, or public areas. Avoid using public transportation, ride-sharing, or taxis. Separate yourself from other people and animals in your home  People: As much as possible, you should stay in a specific room and away from other people in your home.  Also, you should use a separate bathroom, if available. Animals: You should restrict contact with pets and other animals while you are sick with COVID-19, just like you would around other people. Although there have not been reports of pets or other animals becoming sick with COVID-19, it is still recommended that people sick with COVID-19 limit contact with animals until more information is known about the virus. When possible, have another member of your household care for your animals while you are sick. If you are sick with COVID-19, avoid contact with your pet, including petting, snuggling, being kissed or licked, and sharing food. If you must care for your pet or be around animals while you are sick, wash your hands before and after you interact with pets and wear a facemask. Call ahead before visiting your doctor  If you have a medical appointment, call the healthcare provider and tell them that you have or may have COVID-19. This will help the healthcare providers office take steps to keep other people from getting infected or exposed. Wear a facemask  You should wear a facemask when you are around other people (e.g., sharing a room or vehicle) or pets and before you enter a healthcare providers office. If you are not able to wear a facemask (for example, because it causes trouble breathing), then people who live with you should not stay in the same room with you, or they should wear a facemask if they enter your room. Cover your coughs and sneezes  Cover your mouth and nose with a tissue when you cough or sneeze. Throw used tissues in a lined trash can. Immediately wash your hands with soap and water for at least 20 seconds or, if soap and water are not available, clean your hands with an alcohol-based hand  that contains at least 60% alcohol.   Clean your hands often  Wash your hands often with soap and water for at least 20 seconds, especially after blowing your nose, coughing, or sneezing; going to the bathroom; and before eating or preparing food. If soap and water are not readily available, use an alcohol-based hand  with at least 60% alcohol, covering all surfaces of your hands and rubbing them together until they feel dry. Soap and water are the best option if hands are visibly dirty. Avoid touching your eyes, nose, and mouth with unwashed hands. Avoid sharing personal household items  You should not share dishes, drinking glasses, cups, eating utensils, towels, or bedding with other people or pets in your home. After using these items, they should be washed thoroughly with soap and water. Clean all high-touch surfaces everyday  High touch surfaces include counters, tabletops, doorknobs, bathroom fixtures, toilets, phones, keyboards, tablets, and bedside tables. Also, clean any surfaces that may have blood, stool, or body fluids on them. Use a household cleaning spray or wipe, according to the label instructions. Labels contain instructions for safe and effective use of the cleaning product including precautions you should take when applying the product, such as wearing gloves and making sure you have good ventilation during use of the product. Monitor your symptoms  Seek prompt medical attention if your illness is worsening (e.g., difficulty breathing). Before seeking care, call your healthcare provider and tell them that you have, or are being evaluated for, COVID-19. Put on a facemask before you enter the facility. These steps will help the healthcare providers office to keep other people in the office or waiting room from getting infected or exposed. Ask your healthcare provider to call the local or state health department. Persons who are placed under active monitoring or facilitated self-monitoring should follow instructions provided by their local health department or occupational health professionals, as appropriate. When working with your local health department check their available hours.   If you have a medical emergency and need to call 911, notify the dispatch personnel that you have, or are being evaluated for COVID-19. If possible, put on a facemask before emergency medical services arrive. Discontinuing home isolation  Patients with confirmed COVID-19 should remain under home isolation precautions until the risk of secondary transmission to others is thought to be low. The decision to discontinue home isolation precautions should be made on a case-by-case basis, in consultation with healthcare providers and state and local health departments. Patient Education   Learning About Coronavirus [phone])  Coronavirus [phone]): Overview  What is coronavirus (DSTRN-95)? The coronavirus disease (COVID-19) is caused by a virus. It is an illness that was first found in [place], [place], in December 2019. It has since spread worldwide. The virus can cause fever, cough, and trouble breathing. In severe cases, it can cause pneumonia and make it hard to breathe without help. It can cause death. Coronaviruses are a large group of viruses. They cause the common cold. They also cause more serious illnesses like Middle East respiratory syndrome (MERS) and severe acute respiratory syndrome (SARS). COVID-19 is caused by a novel coronavirus. That means it's a new type that has not been seen in people before. This virus spreads person-to-person through droplets from coughing and sneezing. It can also spread when you are close to someone who is infected. And it can spread when you touch something that has the virus on it, such as a doorknob or a tabletop. What can you do to protect yourself from coronavirus (COVID-19)? The best way to protect yourself from getting sick is to:  · Avoid areas where there is an outbreak. · Avoid contact with people who may be infected. · Wash your hands often with soap or alcohol-based hand sanitizers. · Avoid crowds and try to stay at least 6 feet away from other people.   · Wash your hands often, especially after you cough or sneeze. Use soap and water, and scrub for at least 20 seconds. If soap and water aren't available, use an alcohol-based hand . · Avoid touching your mouth, nose, and eyes. What can you do to avoid spreading the virus to others? To help avoid spreading the virus to others:  · Cover your mouth with a tissue when you cough or sneeze. Then throw the tissue in the trash. · Use a disinfectant to clean things that you touch often. · Stay home if you are sick or have been exposed to the virus. Don't go to school, work, or public areas. And don't use public transportation. · If you are sick:  ? Leave your home only if you need to get medical care. But call the doctor's office first so they know you're coming. And wear a face mask, if you have one.  ? If you have a face mask, wear it whenever you're around other people. It can help stop the spread of the virus when you cough or sneeze. ? Clean and disinfect your home every day. Use household  and disinfectant wipes or sprays. Take special care to clean things that you grab with your hands. These include doorknobs, remote controls, phones, and handles on your refrigerator and microwave. And don't forget countertops, tabletops, bathrooms, and computer keyboards. When to call for help  Call 911 anytime you think you may need emergency care. For example, call if:  · You have severe trouble breathing. (You can't talk at all.)  · You have constant chest pain or pressure. · You are severely dizzy or lightheaded. · You are confused or can't think clearly. · Your face and lips have a blue color. · You pass out (lose consciousness) or are very hard to wake up. Call your doctor now if you develop symptoms such as:  · Shortness of breath. · Fever. · Cough. If you need to get care, call ahead to the doctor's office for instructions before you go.  Make sure you wear a face mask, if you have one, to prevent exposing other people to the virus. Where can you get the latest information? The following health organizations are tracking and studying this virus. Their websites contain the most up-to-date information. Tressanasim Chávez also learn what to do if you think you may have been exposed to the virus. · U.S. Centers for Disease Control and Prevention (CDC): The CDC provides updated news about the disease and travel advice. The website also tells you how to prevent the spread of infection. www.cdc.gov  · World Health Organization [facility]): WHO offers information about the virus outbreaks. WHO also has travel advice. www.who.int  Current as of: April 1, 2020               Content Version: 12.4  © 2006-2020 [employer]. Care instructions adapted under license by your healthcare professional. If you have questions about a medical condition or this instruction, always ask your healthcare professional. [address] any warranty or liability for your use of this information.

## 2020-07-10 NOTE — PROGRESS NOTES
Infectious Disease Non-face to Face Encounter    Today's date: 7/10/2020  Admit date: 7/9/2020    Impression:     1. COVID-19 infection: test positive 7/2/20.   2. Acute hypoxic respiratory failure; not currently requiring continuous oxygen support. Recommendations:      Remains on RA with sats in low 90's; afebrile. Has been started on ABX for possible CAP and dexamethasone.  Check baseline ABO compatibility   o If convalescent serum is appropriate, please consult heme/onc  · Since he is currently not requiring oxygen supplementation, he does not qualify for Remdisivir by [facility] criteria. Can reconsider if situation changes. Please notify ID if requires oxygen and we can submit request for Remdesivir to [address] [place] [address]: cut of time daily is 12 noon. · Immediate contraindications to [facility] are LFTs > 5x ULN, CrCl < 30 or ESRD without possibility of transplant, MELD > 20 without possibility of transplant, end stage COPD or CHF, metastatic malignancy, no reasonable expectation of survival > 1 year, intubated with FiO2 > 60%  · Please provide patient with FDA EUA fact sheet (links below) PRIOR to initiation of medication  · Pharmacy information for filling out [address] [place] [address] request below  · Ordering provider will list their name and contact information - this will ONLY be used by [address] [place] [address] to notify you of either medication approval or denial  · If approved, the submitting provider will receive an email confirming approval; following this, the order for remdesivir may be entered.  Please place it under the ID physician on call's name  · If approved, the order is 'non-formulary, remdesivir': 200mg IV x 1 followed by 100mg IV q24h x 4 days  · Please monitor daily CMP while on RDV; if they develop URIAH, RDV should be discontinued  · Adverse events should be submitted through [employer]  · [address] [place] [address] can be contacted at [email or web address] for any issues after submitting a request  · [address] [place] [address] is not accepting any returned drug so if drug is left over because patient discharged, had adverse events, etc., it can be reallocated - please contact [facility] at the email address above   Agree with anticoagulation with Lovenox.  Can start Decadron 6 mg IV daily for up to 10 day given potential for furtehr deterioration; if this does not occur then stop steroids   Recommend screening for HIV and viral hepatitis   Monitoring:  o At least q 48 hour CBC with diff, CMP  o Aggressive pulse Ox monitoring  o Conservative fluid strategy - would not give empiric fluid resuscitation unless signs of volume depletion (hypotension, URIAH, etc) are present  o Consider self proning patient    [facility] Remdesivir request form - [email or web address]  Fact Sheet for Patients And Parent/Caregivers   Fact Sheets for Patients and Parent/Caregivers - Turkish    Pharmacy address:  [address] [place] [address]    Primary contact: [email or web address] [place]  [phone]    Secondary contact: [email or web address] [place]  [phone]    Pharmacy permit # [id number] exp 2/05/3240    Anti-Infectives:      CTX/Azithro 7/9-    Clinical Synopsis:     Dr. Jignesh Ramachandran requesting ID opinion/advice to assist on the care and management of this patient. Patient's chart was reviewed including relevant notes, labs, imaging, medications, pathology, PMH, PSH, FH, and SH. Briefly, patient is a 29 [facility] with history of HTN presented to ED on 7/9 with SOB. Tested positive for COVID 19 at [facility] on 7/2/20. Temp 100.9 on arrival. WBC normal, PLT normal. CRT 1.3: unknown baseline. Normal transaminases. CXR read pending; image below. He is on RA currently; desat to 80 with exertion in ED. Allergies:  No Known Allergies    Anticoagulants:  Key Anti-Platelet Anticoagulant Meds     The patient is on no antiplatelet meds or anticoagulants.            Objective:     Physical Exam:    Vitals:   Current   Temperature: 97.8 °F (36.6 °C)   Heart Rate: 98   Resp Rate: 18   Blood Pressure: 159/86   Oxygen Sats: 97 %         Patient Vitals for the past 12 hrs:   Temp Pulse Resp BP SpO2   07/10/20 0337 97.8 °F (36.6 °C) 98 18 159/86 97 %   07/09/20 2355 99 °F (37.2 °C) 100 16 135/84 91 %   07/09/20 2001 97.8 °F (36.6 °C) (!) 109 16 162/82 91 %       No components found for: QTC      Labs:     COVID 19 related labs:  No results found for: CRP, LDH    CBC:      CBC WITH AUTOMATED DIFF    Collection Time: 07/10/20  5:28 AM   Result Value Ref Range    WBC 4.6 4.3 - 11.1 K/uL    RBC 5.57 4.23 - 5.6 M/uL    HGB 15.2 13.6 - 17.2 g/dL    HCT 47.9 41.1 - 50.3 %    MCV 86.0 79.6 - 97.8 FL    MCH 27.3 26.1 - 32.9 PG    MCHC 31.7 31.4 - 35.0 g/dL    RDW 13.3 11.9 - 14.6 %    PLATELET 795 106 - 308 K/uL    MPV 10.1 9.4 - 12.3 FL    ABSOLUTE NRBC 0.00 0.0 - 0.2 K/uL    NEUTROPHILS 61 43 - 78 %    LYMPHOCYTES 29 13 - 44 %    MONOCYTES 10 4.0 - 12.0 %    EOSINOPHILS 0 (L) 0.5 - 7.8 %    BASOPHILS 0 0.0 - 2.0 %    IMMATURE GRANULOCYTES 0 0.0 - 5.0 %    ABS. NEUTROPHILS 2.8 1.7 - 8.2 K/UL    ABS. LYMPHOCYTES 1.3 0.5 - 4.6 K/UL    ABS. MONOCYTES 0.5 0.1 - 1.3 K/UL    ABS. EOSINOPHILS 0.0 0.0 - 0.8 K/UL    ABS. BASOPHILS 0.0 0.0 - 0.2 K/UL    ABS. IMM. GRANS. 0.0 0.0 - 0.5 K/UL    RBC COMMENTS NORMOCYTIC/NORMOCHROMIC      WBC COMMENTS Result Confirmed By Smear      PLATELET COMMENTS ADEQUATE      DF AUTOMATED         CMP:  No results found for this visit on 07/09/20 (from the past 12 hour(s)). Microbiology:     All Micro Results     None          Recent Imaging:         Signed By: [unfilled]     July 10, 2020      Please note, requesting provider was notified via telmediq of the above documentation for his or her review. The patient's status was discussed with the patient's primary provider as well as the patient's primary nurse.     ID did not physically enter the patient's room, nor did ID use a remote telehealth monitor, due to facility restrictions on the use of personal protective equipment and the need to preserve personal protective equipment at this time.     Time spent on the above: 15 minutes

## 2020-07-10 NOTE — ACP (ADVANCE CARE PLANNING)
Advance Care Planning     Advance Care Planning Activator (Inpatient)  Conversation Note      Date of ACP Conversation: 07/10/20     Conversation Conducted with:   Patient with Decision Making Capacity    ACP Activator: Barbara Rodríguez RN    *When Decision Maker makes decisions on behalf of the incapacitated patient: Decision Maker is asked to consider and make decisions based on patient values, known preferences, or best interests. Health Care Decision Maker:    Current Designated Health Care Decision Maker:   (If there is a valid [place] named in the [address] [place] [address] Makers\" box in the ACP activity, but it is not visible above, be sure to open that field and then select the health care decision maker relationship (ie \"primary\") in the blank space to the right of the name.) Validate  this information as still accurate & up-to-date; edit [place] field as needed.)    Note: Assess and validate information in current ACP documents, as indicated. If no Decision Maker listed above or available through scanned documents, then:    If no Authorized Decision Maker has previously been identified, then patient chooses [place]:  \"Who would you like to name as your primary health care decision-maker? \"    Name: Sharda Belle   Relationship: Mother Phone number: [phone]  Jose De Jesus Healy this person be reached easily? \" YES  \"Who would you like to name as your back-up decision maker? \"   Name:   Relationship:  Phone number:   \"Can this person be reached easily? \" YES    Note: If the relationship of these Decision-Makers to the patient does NOT follow your state's Next of Kin hierarchy, recommend that patient complete ACP document that meets state-specific requirements to allow them to act on the patient's behalf when appropriate. Care Preferences    Ventilation:   \"If you were in your present state of health and suddenly became very ill and were unable to breathe on your own, what would your preference be about the use of a ventilator (breathing machine) if it were available to you? \"      If patient would desire the use of a ventilator (breathing machine), answer \"yes\", if not \"no\":yes    \"If your health worsens and it becomes clear that your chance of recovery is unlikely, what would your preference be about the use of a ventilator (breathing machine) if it were available to you? \"     Would the patient desire the use of a ventilator (breathing machine)? YES      Resuscitation  \"CPR works best to restart the heart when there is a sudden event, like a heart attack, in someone who is otherwise healthy. Unfortunately, CPR does not typically restart the heart for people who have serious health conditions or who are very sick. \"    \"In the event your heart stopped as a result of an underlying serious health condition, would you want attempts to be made to restart your heart (answer \"yes\" for attempt to resuscitate) or would you prefer a natural death (answer \"no\" for do not attempt to resuscitate)? \" yes      NOTE: If the patient has a valid advance directive AND now provides care preference(s) that are inconsistent with that prior directive, advise the patient to consider either: creating a new advance directive that complies with state-specific requirements; or, if that is not possible, orally revoking that prior directive in accordance with state-specific requirements, which must be documented in the EHR. [x] Yes  [] No   Educated Patient / Shell Khan regarding differences between Advance Directives and portable DNR orders.     Length of ACP Conversation in minutes:      Conversation Outcomes:  [x] ACP discussion completed  [] Existing advance directive reviewed with patient; no changes to patient's previously recorded wishes     [] New Advance Directive completed   [] Portable Do Not Resuscitate prepared for Provider review and signature  [] POLST/POST/MOLST/MOST prepared for Provider review and signature      Follow-up plan:    [] Schedule follow-up conversation to continue planning  [] Referred individual to Provider for additional questions/concerns   [] Advised patient/agent/surrogate to review completed ACP document and update if needed with changes in condition, patient preferences or care setting     [x] This note routed to one or more involved healthcare providers

## 2020-07-10 NOTE — PROGRESS NOTES
Progress Note    Patient: Yamini Keller MRN: [id number]  SSN: [id number]    YOB: 1990  Age: 30 y.o. Sex: male      Admit Date: 7/9/2020    LOS: 1 day     Subjective:   He was found in no acute distress. The patient is on room air. He feels better and stated \" supplemental oxygen is bothersome to his nares\". Otherwise, he complains of dry cough, which improves on hycodan. Objective:     Vitals:    07/09/20 1618 07/09/20 2001 07/09/20 2355 07/10/20 0337   BP: (!) 150/99 162/82 135/84 159/86   Pulse: (!) 108 (!) 109 100 98   Resp: 16 16 16 18   Temp: 97.7 °F (36.5 °C) 97.8 °F (36.6 °C) 99 °F (37.2 °C) 97.8 °F (36.6 °C)   SpO2: 92% 91% 91% 97%        Intake and Output:  Current Shift: No intake/output data recorded. Last three shifts: No intake/output data recorded. Physical Exam:   GENERAL: alert, cooperative, no distress, appears stated age  EYE: negative  LYMPHATIC: Cervical, supraclavicular, and axillary nodes normal.   THROAT & NECK: normal and no erythema or exudates noted. LUNG: clear to auscultation bilaterally  HEART: regular rate and rhythm, S1, S2 normal, no murmur, click, rub or gallop  ABDOMEN: soft, non-tender. Bowel sounds normal. No masses,  no organomegaly  EXTREMITIES:  extremities normal, atraumatic, no cyanosis or edema  SKIN: Normal.  NEUROLOGIC: negative  PSYCHIATRIC: non focal    Lab/Data Review: All lab results for the last 24 hours reviewed. Assessment:     Active Hospital Problems    Diagnosis Date Noted    Pneumonia due to COVID-19 virus 07/09/2020    DM (diabetes mellitus) ([facility] 07/09/2020    HTN (hypertension) 07/09/2020    Sinus tachycardia 07/09/2020    Atypical chest pain 07/09/2020    Obesity, morbid ([facility] 09/24/2019       Plan:   -Cpap, possible related to covid-19:    On room air currently, saturating 90%  Will DC antibiotics   Continue iv decadron x 10 days  Hycodan for cough  Convalescent plasma ordered, awaiting to infuse  Monitor BCs  Daily labs     -DM type II  - hold home metformin  - Humalog SSI and serial CBGs     -Sinus tachycardia: resolved      - Atypical chest pain: resolved         Ppx: Lovenox for VTE     Code Status: FULL CODE     Disposition: possible home within 1-2 days     Signed By: Shae Almodovar MD     July 10, 2020

## 2020-07-10 NOTE — PROGRESS NOTES
Problem: Falls - Risk of  Goal: *Absence of Falls  Description: Document Dottie Kamara Fall Risk and appropriate interventions in the flowsheet.   Outcome: Progressing Towards Goal  Note: Fall Risk Interventions:    Medication Interventions: Teach patient to arise slowly       Problem: Patient Education: Go to Patient Education Activity  Goal: Patient/Family Education  Outcome: Progressing Towards Goal

## 2020-07-11 VITALS
HEART RATE: 95 BPM | SYSTOLIC BLOOD PRESSURE: 144 MMHG | RESPIRATION RATE: 24 BRPM | TEMPERATURE: 97.2 F | OXYGEN SATURATION: 90 % | DIASTOLIC BLOOD PRESSURE: 94 MMHG

## 2020-07-11 LAB
ANION GAP SERPL CALC-SCNC: 7 MMOL/L (ref 7–16)
BASOPHILS # BLD: 0 K/UL (ref 0–0.2)
BASOPHILS NFR BLD: 0 % (ref 0–2)
BLD PROD TYP BPU: NORMAL
BLOOD BANK CMNT PATIENT-IMP: NORMAL
BPU ID: NORMAL
BUN SERPL-MCNC: 17 MG/DL (ref 6–23)
CALCIUM SERPL-MCNC: 8.8 MG/DL (ref 8.3–10.4)
CHLORIDE SERPL-SCNC: 103 MMOL/L (ref 98–107)
CO2 SERPL-SCNC: 28 MMOL/L (ref 21–32)
CREAT SERPL-MCNC: 1.15 MG/DL (ref 0.8–1.5)
DIFFERENTIAL METHOD BLD: ABNORMAL
EOSINOPHIL # BLD: 0 K/UL (ref 0–0.8)
EOSINOPHIL NFR BLD: 0 % (ref 0.5–7.8)
ERYTHROCYTE [DISTWIDTH] IN BLOOD BY AUTOMATED COUNT: 13.4 % (ref 11.9–14.6)
GLUCOSE BLD STRIP.AUTO-MCNC: 127 MG/DL (ref 65–100)
GLUCOSE BLD STRIP.AUTO-MCNC: 93 MG/DL (ref 65–100)
GLUCOSE SERPL-MCNC: 127 MG/DL (ref 65–100)
HCT VFR BLD AUTO: 48.1 % (ref 41.1–50.3)
HGB BLD-MCNC: 16.6 G/DL (ref 13.6–17.2)
IMM GRANULOCYTES # BLD AUTO: 0.1 K/UL (ref 0–0.5)
IMM GRANULOCYTES NFR BLD AUTO: 1 % (ref 0–5)
LYMPHOCYTES # BLD: 1.6 K/UL (ref 0.5–4.6)
LYMPHOCYTES NFR BLD: 32 % (ref 13–44)
MCH RBC QN AUTO: 28.7 PG (ref 26.1–32.9)
MCHC RBC AUTO-ENTMCNC: 34.5 G/DL (ref 31.4–35)
MCV RBC AUTO: 83.2 FL (ref 79.6–97.8)
MONOCYTES # BLD: 0.3 K/UL (ref 0.1–1.3)
MONOCYTES NFR BLD: 5 % (ref 4–12)
NEUTS SEG # BLD: 3 K/UL (ref 1.7–8.2)
NEUTS SEG NFR BLD: 62 % (ref 43–78)
NRBC # BLD: 0 K/UL (ref 0–0.2)
PLATELET # BLD AUTO: 236 K/UL (ref 150–450)
PMV BLD AUTO: 10.1 FL (ref 9.4–12.3)
POTASSIUM SERPL-SCNC: 3.8 MMOL/L (ref 3.5–5.1)
RBC # BLD AUTO: 5.78 M/UL (ref 4.23–5.6)
SODIUM SERPL-SCNC: 138 MMOL/L (ref 136–145)
STATUS OF UNIT,%ST: NORMAL
UNIT DIVISION, %UDIV: NORMAL
WBC # BLD AUTO: 5 K/UL (ref 4.3–11.1)

## 2020-07-11 PROCEDURE — 74011250637 HC RX REV CODE- 250/637: Performed by: FAMILY MEDICINE

## 2020-07-11 PROCEDURE — 74011250637 HC RX REV CODE- 250/637: Performed by: INTERNAL MEDICINE

## 2020-07-11 PROCEDURE — 74011250636 HC RX REV CODE- 250/636: Performed by: INTERNAL MEDICINE

## 2020-07-11 PROCEDURE — 85025 COMPLETE CBC W/AUTO DIFF WBC: CPT

## 2020-07-11 PROCEDURE — 80048 BASIC METABOLIC PNL TOTAL CA: CPT

## 2020-07-11 PROCEDURE — 82962 GLUCOSE BLOOD TEST: CPT

## 2020-07-11 RX ORDER — ACETAMINOPHEN 325 MG/1
650 TABLET ORAL
Qty: 30 TAB | Refills: 0 | Status: SHIPPED | OUTPATIENT
Start: 2020-07-11

## 2020-07-11 RX ORDER — DEXAMETHASONE 4 MG/1
6 TABLET ORAL
Qty: 10 TAB | Refills: 0 | Status: SHIPPED | OUTPATIENT
Start: 2020-07-11 | End: 2020-07-16

## 2020-07-11 RX ORDER — PANTOPRAZOLE SODIUM 40 MG/1
40 TABLET, DELAYED RELEASE ORAL
Qty: 5 TAB | Refills: 0 | Status: SHIPPED | OUTPATIENT
Start: 2020-07-12 | End: 2020-07-17

## 2020-07-11 RX ORDER — DEXAMETHASONE 4 MG/1
6 TABLET ORAL DAILY
Status: DISCONTINUED | OUTPATIENT
Start: 2020-07-11 | End: 2020-07-11 | Stop reason: HOSPADM

## 2020-07-11 RX ADMIN — ENOXAPARIN SODIUM 40 MG: 40 INJECTION SUBCUTANEOUS at 02:15

## 2020-07-11 RX ADMIN — DEXAMETHASONE 6 MG: 4 TABLET ORAL at 09:16

## 2020-07-11 RX ADMIN — HYDROCODONE BITARTRATE AND HOMATROPINE METHYLBROMIDE 5 ML: 5; 1.5 SOLUTION ORAL at 09:16

## 2020-07-11 RX ADMIN — PANTOPRAZOLE SODIUM 40 MG: 40 TABLET, DELAYED RELEASE ORAL at 07:13

## 2020-07-11 RX ADMIN — Medication 10 ML: at 07:13

## 2020-07-11 NOTE — PROGRESS NOTES
Problem: Falls - Risk of  Goal: *Absence of Falls  Description: Document Blas Bridges Fall Risk and appropriate interventions in the flowsheet.   Outcome: Progressing Towards Goal  Note: Fall Risk Interventions:            Medication Interventions: Teach patient to arise slowly                   Problem: Patient Education: Go to Patient Education Activity  Goal: Patient/Family Education  Outcome: Progressing Towards Goal

## 2020-07-11 NOTE — PROGRESS NOTES
Problem: Falls - Risk of  Goal: *Absence of Falls  Description: Document Shanice Payal Fall Risk and appropriate interventions in the flowsheet.   Outcome: Resolved/Not Met     Problem: Patient Education: Go to Patient Education Activity  Goal: Patient/Family Education  Outcome: Resolved/Not Met

## 2020-07-11 NOTE — PROGRESS NOTES
Completed convalescent plasma infusion at 2030. Patient tolerated without any s/s of distress. 4 hour post transfusion vitals: /90, HR 87, Oxygen 93% on room air; breathing even, unlabored. No c/o SOB.

## 2020-07-11 NOTE — PROGRESS NOTES
Pt in bed watching TV at this time. Dyspnea with exertion. Lower lungs sounds diminished with crackles. Pt states slight dizziness when ambulating to bathroom last, better once back in bed. Currently O2 sat 93% on room air. IVs are clean dry and intact. No signs of distress at this time. Pt instructed to call for assistance. Will continue to monitor.

## 2020-07-11 NOTE — PROGRESS NOTES
Pt is medically cleared for dc to home today. No dc needs identified or reported. SW remains available to assist if needed. Care Management Interventions  PCP Verified by CM: Yes(THIERRY Bernal)  Mode of Transport at Discharge:  Other (see comment)(family to transport)  Transition of Care Consult (CM Consult): Discharge Planning  Current Support Network: Own Home, Lives Alone  Confirm Follow Up Transport: Self  Freedom of Choice List was Provided with Basic Dialogue that Supports the Patient's Individualized Plan of Care/Goals, Treatment Preferences and Shares the Quality Data Associated with the Providers?: Yes  Discharge Location  Discharge Placement: Home

## 2020-07-11 NOTE — PROGRESS NOTES
Problem: Falls - Risk of  Goal: *Absence of Falls  Description: Document Kimberlee Benito Fall Risk and appropriate interventions in the flowsheet.   Outcome: Progressing Towards Goal  Note: Fall Risk Interventions:            Medication Interventions: Teach patient to arise slowly                   Problem: Patient Education: Go to Patient Education Activity  Goal: Patient/Family Education  Outcome: Progressing Towards Goal

## 2020-07-11 NOTE — PROGRESS NOTES
Discharge instructions provided to patient by Bryson Izaguirre RN. AxO x4. No questions at this time. No signs of distress. All lines removed by Bryson Izaguirre RN. Patient discharged with all belongings. Discharged via wheelchair by Bryson Izaguirre, [address] [place] [address].

## 2020-07-11 NOTE — DISCHARGE SUMMARY
Discharge Summary     Patient: Estela Campos MRN: [id number]  SSN: [id number]    YOB: 1990  Age: 30 y.o. Sex: male       Admit Date: 7/9/2020    Discharge Date: 7/11/2020      Admission Diagnoses: Pneumonia due to COVID-19 virus [U07.1, J12.89]    Discharge Diagnoses:   Problem List as of 7/11/2020 Date Reviewed: 4/23/2020          Codes Class Noted - Resolved    HTN (hypertension) ICD-10-CM: I10  ICD-9-CM: 401.9  7/9/2020 - Present        DM (diabetes mellitus) ([facility] ICD-10-CM: E11.9  ICD-9-CM: 250.00  7/9/2020 - Present        COVID-19 ICD-10-CM: U07.1  ICD-9-CM: 079.89  7/9/2020 - Present        Sinus tachycardia ICD-10-CM: R00.0  ICD-9-CM: 427.89  7/9/2020 - Present        Atypical chest pain ICD-10-CM: R07.89  ICD-9-CM: 786.59  7/9/2020 - Present        Sepsis ([facility] ICD-10-CM: A41.9  ICD-9-CM: 038.9, 995.91  7/9/2020 - Present        * (Principal) Pneumonia due to COVID-19 virus ICD-10-CM: U07.1, J12.89  ICD-9-CM: 480.8  7/9/2020 - Present        Obesity, morbid ([facility] ICD-10-CM: E66.01  ICD-9-CM: 278.01  9/24/2019 - Present               Discharge Condition: [place] Course:   Mr. Kathy Bonilla is a 29 yo AAM with past history of HTN and NIDDM type II presents with worsening shortness of breath and fevers/chills. He was diagnosed with COVID-19 on July 3 at [facility]. ED Course: patient with HRs ranging from 130-150, exacerbated by marching in place with drop in O2 sats to upper 80s on RA when moving around. Blood cultures collected and given empiric Rocephin/azithro for CAP coverage. He was admitted on supplemental oxygen. The patient received 1 unit of convalescent plasma and was started on dexamethasone. His clinical status remained stable and he was able to be weaned off the oxygen. He was counseled to keep adequate hydration, antipyretics. He is stable enough to be discharged.      Physical Exam:   GENERAL: alert, cooperative, no distress, appears stated age  EYE: negative  LYMPHATIC: Cervical, supraclavicular, and axillary nodes normal.   THROAT & NECK: normal and no erythema or exudates noted. LUNG: clear to auscultation bilaterally  HEART: regular rate and rhythm, S1, S2 normal, no murmur, click, rub or gallop  ABDOMEN: soft, non-tender. Bowel sounds normal. No masses,  no organomegaly  EXTREMITIES:  extremities normal, atraumatic, no cyanosis or edema  SKIN: Normal.  NEUROLOGIC: negative  PSYCHIATRIC: non focal    Consults: None    Significant Diagnostic Studies: see note     Disposition: home    Discharge Medications:   Current Discharge Medication List      START taking these medications    Details   acetaminophen (TYLENOL) 325 mg tablet Take 2 Tabs by mouth every six (6) hours as needed for Pain or Fever. Qty: 30 Tab, Refills: 0      dexAMETHasone (DECADRON) 4 mg tablet Take 6 mg by mouth Daily (before breakfast) for 5 days. Take 6 mg daily x 5 days  Qty: 10 Tab, Refills: 0      pantoprazole (PROTONIX) 40 mg tablet Take 1 Tab by mouth Daily (before breakfast) for 5 days. Qty: 5 Tab, Refills: 0         CONTINUE these medications which have NOT CHANGED    Details   metFORMIN (GLUCOPHAGE) 500 mg tablet Take 500 mg by mouth daily (with breakfast). irbesartan (AVAPRO) 150 mg tablet Take 1 Tab by mouth nightly. Qty: 90 Tab, Refills: 1    Associated Diagnoses: Uncontrolled hypertension      hydroCHLOROthiazide (HYDRODIURIL) 25 mg tablet Take 1 Tab by mouth daily. Qty: 90 Tab, Refills: 1    Associated Diagnoses: Uncontrolled hypertension      atenoloL (TENORMIN) 50 mg tablet Take 1 Tab by mouth daily as needed for Pain. Qty: 90 Tab, Refills: 0    Associated Diagnoses: Uncontrolled hypertension      Blood-Glucose Meter monitoring kit Test blood sugar two times a day.   Qty: 1 Kit, Refills: 0    Associated Diagnoses: Type 2 diabetes mellitus without complication, without long-term current use of insulin ([facility])      glucose blood VI test strips (ASCENSIA AUTODISC VI, ONE TOUCH ULTRA TEST VI) strip Test blood sugar two times a day. Qty: 100 Strip, Refills: 11    Associated Diagnoses: Type 2 diabetes mellitus without complication, without long-term current use of insulin (HCC)      lancets misc Test blood sugar two times a day. Qty: 100 Each, Refills: 11    Associated Diagnoses: Type 2 diabetes mellitus without complication, without long-term current use of insulin (HCC)      butalbital-aspirin-caffeine (FIORINAL) capsule Take 1 Cap by mouth every four (4) hours as needed for Pain. Activity: Activity as tolerated  Diet: Regular Diet  Wound Care: None needed    Follow-up Appointments   Procedures    FOLLOW UP VISIT Appointment in: One Week pcp     pcp     Standing Status:   Standing     Number of Occurrences:   1     Order Specific Question:   Appointment in     Answer:    One Week       Signed By: Taylor Granados MD     July 11, 2020

## 2020-07-13 ENCOUNTER — PATIENT OUTREACH (OUTPATIENT)
Dept: CASE MANAGEMENT | Age: 30
End: 2020-07-13

## 2020-07-14 LAB
BACTERIA SPEC CULT: NORMAL
BACTERIA SPEC CULT: NORMAL
SERVICE CMNT-IMP: NORMAL
SERVICE CMNT-IMP: NORMAL

## 2020-07-14 NOTE — PROGRESS NOTES
Recorded message: cannot accept calls at this time    This note will not be viewable in [address]

## 2020-07-30 ENCOUNTER — PATIENT OUTREACH (OUTPATIENT)
Dept: CASE MANAGEMENT | Age: 30
End: 2020-07-30

## 2022-02-14 ENCOUNTER — HOSPITAL ENCOUNTER (EMERGENCY)
Age: 32
Discharge: HOME | End: 2022-02-14
Attending: EMERGENCY MEDICINE
Payer: COMMERCIAL

## 2022-02-14 ENCOUNTER — APPOINTMENT (OUTPATIENT)
Dept: CT IMAGING | Age: 32
End: 2022-02-14
Attending: PHYSICIAN ASSISTANT
Payer: COMMERCIAL

## 2022-02-14 VITALS
TEMPERATURE: 98.4 F | SYSTOLIC BLOOD PRESSURE: 176 MMHG | OXYGEN SATURATION: 98 % | HEART RATE: 95 BPM | RESPIRATION RATE: 18 BRPM | HEIGHT: 67 IN | BODY MASS INDEX: 42.38 KG/M2 | DIASTOLIC BLOOD PRESSURE: 117 MMHG | WEIGHT: 270 LBS

## 2022-02-14 DIAGNOSIS — K92.1 SYMPTOM OF BLOOD IN STOOL: Primary | ICD-10-CM

## 2022-02-14 LAB
ALBUMIN SERPL-MCNC: 3.6 G/DL (ref 3.5–5)
ALBUMIN/GLOB SERPL: 0.9 {RATIO} (ref 1.2–3.5)
ALP SERPL-CCNC: 90 U/L (ref 50–136)
ALT SERPL-CCNC: 38 U/L (ref 12–65)
ANION GAP SERPL CALC-SCNC: 4 MMOL/L (ref 7–16)
AST SERPL-CCNC: 18 U/L (ref 15–37)
BASOPHILS # BLD: 0 K/UL (ref 0–0.2)
BASOPHILS # BLD: 0 K/UL (ref 0–0.2)
BASOPHILS NFR BLD: 0 % (ref 0–2)
BASOPHILS NFR BLD: 1 % (ref 0–2)
BILIRUB SERPL-MCNC: 0.4 MG/DL (ref 0.2–1.1)
BUN SERPL-MCNC: 13 MG/DL (ref 6–23)
CALCIUM SERPL-MCNC: 9.2 MG/DL (ref 8.3–10.4)
CHLORIDE SERPL-SCNC: 108 MMOL/L (ref 98–107)
CO2 SERPL-SCNC: 28 MMOL/L (ref 21–32)
CREAT SERPL-MCNC: 1.33 MG/DL (ref 0.8–1.5)
DIFFERENTIAL METHOD BLD: ABNORMAL
DIFFERENTIAL METHOD BLD: ABNORMAL
EOSINOPHIL # BLD: 0 K/UL (ref 0–0.8)
EOSINOPHIL # BLD: 0.1 K/UL (ref 0–0.8)
EOSINOPHIL NFR BLD: 1 % (ref 0.5–7.8)
EOSINOPHIL NFR BLD: 1 % (ref 0.5–7.8)
ERYTHROCYTE [DISTWIDTH] IN BLOOD BY AUTOMATED COUNT: 13.3 % (ref 11.9–14.6)
ERYTHROCYTE [DISTWIDTH] IN BLOOD BY AUTOMATED COUNT: 13.4 % (ref 11.9–14.6)
GLOBULIN SER CALC-MCNC: 4.1 G/DL (ref 2.3–3.5)
GLUCOSE SERPL-MCNC: 84 MG/DL (ref 65–100)
HCT VFR BLD AUTO: 47.6 % (ref 41.1–50.3)
HCT VFR BLD AUTO: 47.7 % (ref 41.1–50.3)
HGB BLD-MCNC: 15.7 G/DL (ref 13.6–17.2)
HGB BLD-MCNC: 15.9 G/DL (ref 13.6–17.2)
IMM GRANULOCYTES # BLD AUTO: 0 K/UL (ref 0–0.5)
IMM GRANULOCYTES # BLD AUTO: 0 K/UL (ref 0–0.5)
IMM GRANULOCYTES NFR BLD AUTO: 0 % (ref 0–5)
IMM GRANULOCYTES NFR BLD AUTO: 0 % (ref 0–5)
LIPASE SERPL-CCNC: 336 U/L (ref 73–393)
LYMPHOCYTES # BLD: 2.4 K/UL (ref 0.5–4.6)
LYMPHOCYTES # BLD: 2.5 K/UL (ref 0.5–4.6)
LYMPHOCYTES NFR BLD: 53 % (ref 13–44)
LYMPHOCYTES NFR BLD: 56 % (ref 13–44)
MCH RBC QN AUTO: 28.1 PG (ref 26.1–32.9)
MCH RBC QN AUTO: 28.1 PG (ref 26.1–32.9)
MCHC RBC AUTO-ENTMCNC: 32.9 G/DL (ref 31.4–35)
MCHC RBC AUTO-ENTMCNC: 33.4 G/DL (ref 31.4–35)
MCV RBC AUTO: 84.1 FL (ref 79.6–97.8)
MCV RBC AUTO: 85.5 FL (ref 79.6–97.8)
MONOCYTES # BLD: 0.4 K/UL (ref 0.1–1.3)
MONOCYTES # BLD: 0.5 K/UL (ref 0.1–1.3)
MONOCYTES NFR BLD: 10 % (ref 4–12)
MONOCYTES NFR BLD: 9 % (ref 4–12)
NEUTS SEG # BLD: 1.5 K/UL (ref 1.7–8.2)
NEUTS SEG # BLD: 1.6 K/UL (ref 1.7–8.2)
NEUTS SEG NFR BLD: 34 % (ref 43–78)
NEUTS SEG NFR BLD: 35 % (ref 43–78)
NRBC # BLD: 0 K/UL (ref 0–0.2)
NRBC # BLD: 0 K/UL (ref 0–0.2)
PLATELET # BLD AUTO: 229 K/UL (ref 150–450)
PLATELET # BLD AUTO: 238 K/UL (ref 150–450)
PMV BLD AUTO: 10.2 FL (ref 9.4–12.3)
PMV BLD AUTO: 10.2 FL (ref 9.4–12.3)
POTASSIUM SERPL-SCNC: 4.3 MMOL/L (ref 3.5–5.1)
PROT SERPL-MCNC: 7.7 G/DL (ref 6.3–8.2)
RBC # BLD AUTO: 5.58 M/UL (ref 4.23–5.6)
RBC # BLD AUTO: 5.66 M/UL (ref 4.23–5.6)
SODIUM SERPL-SCNC: 140 MMOL/L (ref 136–145)
WBC # BLD AUTO: 4.5 K/UL (ref 4.3–11.1)
WBC # BLD AUTO: 4.6 K/UL (ref 4.3–11.1)

## 2022-02-14 PROCEDURE — 80053 COMPREHEN METABOLIC PANEL: CPT

## 2022-02-14 PROCEDURE — 85025 COMPLETE CBC W/AUTO DIFF WBC: CPT

## 2022-02-14 PROCEDURE — 99282 EMERGENCY DEPT VISIT SF MDM: CPT

## 2022-02-14 PROCEDURE — 83690 ASSAY OF LIPASE: CPT

## 2022-02-14 RX ORDER — SODIUM CHLORIDE 0.9 % (FLUSH) 0.9 %
10 SYRINGE (ML) INJECTION
Status: DISCONTINUED | OUTPATIENT
Start: 2022-02-14 | End: 2022-02-14

## 2022-02-14 RX ORDER — SODIUM CHLORIDE 0.9 % (FLUSH) 0.9 %
5-10 SYRINGE (ML) INJECTION EVERY 8 HOURS
Status: DISCONTINUED | OUTPATIENT
Start: 2022-02-14 | End: 2022-02-14 | Stop reason: HOSPADM

## 2022-02-14 RX ORDER — SODIUM CHLORIDE 0.9 % (FLUSH) 0.9 %
5-10 SYRINGE (ML) INJECTION AS NEEDED
Status: DISCONTINUED | OUTPATIENT
Start: 2022-02-14 | End: 2022-02-14 | Stop reason: HOSPADM

## 2022-02-14 NOTE — ED NOTES
I have reviewed discharge instructions with the patient. The patient verbalized understanding. Patient left ED via Discharge Method: ambulatory to Home with self. Opportunity for questions and clarification provided. Patient given 0 scripts. To continue your aftercare when you leave the hospital, you may receive an automated call from our care team to check in on how you are doing. This is a free service and part of our promise to provide the best care and service to meet your aftercare needs.  If you have questions, or wish to unsubscribe from this service please call [phone]. Thank you for Choosing our [facility] Emergency Department.

## 2022-02-14 NOTE — ED TRIAGE NOTES
Pt with low abdominal pain for about a week. Pt states that one time last week he had bright red BM. Pt also stating that he thinks that he had \"worms\" in his stool last night.

## 2022-02-14 NOTE — ED PROVIDER NOTES
Patient is a 57-year-old male with history of hypertension diabetes who presents with concern over blood that he saw on his stool last week. He has not had any bleeding since then. Denies abdominal pain. He has had abdominal pain intermittently over the past week but none now. Yesterday when he had a bowel movement he had white noodle-looking substance in his stool that he is concerned could be worms. Has not had any recent travel. He has an aunt who  from colorectal cancer which is what caused him to be concerned about blood in the stool. Since last week he has had no blood in stool. He is not anticoagulated. No rectal pain. No hemorrhoids or fissures that he is aware of. He does endorse anal sex, most recent time was yesterday, did not have any last week prior to blood in stool. Says it has been a while since last night. Past Medical History:   Diagnosis Date    [facility] ([facility] 2020    Headache     Hypertension        No past surgical history on file. Family History:   Problem Relation Age of Onset    No Known Problems Mother     No Known Problems Father        Social History     Socioeconomic History    Marital status: SINGLE     Spouse name: Not on file    Number of children: Not on file    Years of education: Not on file    Highest education level: Not on file   Occupational History    Not on file   Tobacco Use    Smoking status: Never Smoker    Smokeless tobacco: Never Used   Vaping Use    Vaping Use: Never used   Substance and Sexual Activity    Alcohol use:  Yes    Drug use: Never    Sexual activity: Not Currently   Other Topics Concern    Not on file   Social History Narrative    Not on file     Social Determinants of Health     Financial Resource Strain:     Difficulty of Paying Living Expenses: Not on file   Food Insecurity:     Worried About Running Out of Food in the Last Year: Not on file    Sarah of Food in the Last Year: Not on file   Transportation Needs:     Lack of Transportation (Medical): Not on file    Lack of Transportation (Non-Medical): Not on file   Physical Activity:     Days of Exercise per Week: Not on file    Minutes of Exercise per Session: Not on file   Stress:     Feeling of Stress : Not on file   Social Connections:     Frequency of Communication with Friends and Family: Not on file    Frequency of Social Gatherings with Friends and Family: Not on file    Attends [employer] Services: Not on file    Active Member of [address] [place] [address] [employer] or Organizations: Not on file    Attends Club or Organization Meetings: Not on file    Marital Status: Not on file   Intimate Partner Violence:     Fear of Current or Ex-Partner: Not on file    Emotionally Abused: Not on file    Physically Abused: Not on file    Sexually Abused: Not on file   Housing Stability:     Unable to Pay for Housing in the Last Year: Not on file    Number of [place] in the Last Year: Not on file    Unstable Housing in the Last Year: Not on file         ALLERGIES: Patient has no known allergies. Review of Systems   Constitutional: Negative. HENT: Negative. Respiratory: Negative. Cardiovascular: Negative. Gastrointestinal: Positive for blood in stool. Genitourinary: Negative. All other systems reviewed and are negative. Vitals:    02/14/22 1230   BP: (!) 176/117   Pulse: 95   Resp: 18   Temp: 98.4 °F (36.9 °C)   SpO2: 98%   Weight: 122.5 kg (270 lb)   Height: 5' 7\" (1.702 m)            Physical Exam  Vitals and nursing note reviewed. Constitutional:       General: He is not in acute distress. Appearance: Normal appearance. He is obese. He is not ill-appearing or toxic-appearing. HENT:      Head: Normocephalic. Mouth/Throat:      Mouth: Mucous membranes are moist.   Eyes:      Extraocular Movements: Extraocular movements intact. Cardiovascular:      Rate and Rhythm: Normal rate and regular rhythm.       Heart sounds: Normal heart sounds. Pulmonary:      Effort: Pulmonary effort is normal.      Breath sounds: Normal breath sounds. Abdominal:      General: Abdomen is flat. Bowel sounds are normal.      Palpations: Abdomen is soft. Tenderness: There is no abdominal tenderness. There is no guarding or rebound. Genitourinary:     Rectum: Guaiac result negative. Comments: Female RN present for exam.  Patient has one nonthrombosed hemorrhoid around 9:00 location. Nontender. Hemoccult negative. Musculoskeletal:         General: Normal range of motion. Skin:     General: Skin is warm and dry. Neurological:      General: No focal deficit present. Mental Status: He is alert and oriented to person, place, and time. Mental status is at baseline. MDM  Number of Diagnoses or Management Options  Symptom of blood in stool  Diagnosis management comments: Patient is 75-year-old male who presented with concern of blood in stool he saw last week. None since then. Did have some noodle-looking substance in his stool last night. He cannot provide stool sample here. Hemoccult negative. Benign abdominal exam.  No abdominal pain. Labs unrevealing. Hemoglobin stable. He is to follow-up closely outpatient with GI. He is to return if worsening in any way.        Amount and/or Complexity of Data Reviewed  Clinical lab tests: reviewed    Risk of Complications, Morbidity, and/or Mortality  Presenting problems: moderate  Diagnostic procedures: moderate  Management options: moderate    Patient Progress  Patient progress: stable         Procedures

## 2022-03-15 ENCOUNTER — HOSPITAL ENCOUNTER (OUTPATIENT)
Dept: LAB | Age: 32
Discharge: HOME | End: 2022-03-15

## 2022-03-15 PROCEDURE — 88305 TISSUE EXAM BY PATHOLOGIST: CPT

## 2022-03-19 PROBLEM — R07.89 ATYPICAL CHEST PAIN: Status: ACTIVE | Noted: 2020-07-09

## 2022-03-19 PROBLEM — U07.1 PNEUMONIA DUE TO COVID-19 VIRUS: Status: ACTIVE | Noted: 2020-07-09

## 2022-03-19 PROBLEM — A41.9 SEPSIS (HCC): Status: ACTIVE | Noted: 2020-07-09

## 2022-03-19 PROBLEM — J12.82 PNEUMONIA DUE TO COVID-19 VIRUS: Status: ACTIVE | Noted: 2020-07-09

## 2022-03-19 PROBLEM — R00.0 SINUS TACHYCARDIA: Status: ACTIVE | Noted: 2020-07-09

## 2022-03-19 PROBLEM — I10 HTN (HYPERTENSION): Status: ACTIVE | Noted: 2020-07-09

## 2022-03-19 PROBLEM — E11.9 DM (DIABETES MELLITUS) (HCC): Status: ACTIVE | Noted: 2020-07-09

## 2022-03-19 PROBLEM — E66.01 OBESITY, MORBID (HCC): Status: ACTIVE | Noted: 2019-09-24

## 2022-03-20 PROBLEM — U07.1 COVID-19: Status: ACTIVE | Noted: 2020-07-09

## 2022-09-30 ENCOUNTER — TELEPHONE (OUTPATIENT)
Dept: INTERNAL MEDICINE CLINIC | Facility: CLINIC | Age: 32
End: 2022-09-30

## 2022-09-30 NOTE — TELEPHONE ENCOUNTER
Patient request refills on Metformin, Irbesartan 150 mg and Amlodipine 5 mg. Austine seen with Dr. Anselm Merlin on 06/24/2021.  He needs to schedule fasting lab and one week follow up with Dr. Terrilyn Habermann first to get this filled

## 2022-09-30 NOTE — TELEPHONE ENCOUNTER
----- Message from [facility] sent at 9/30/2022 10:43 AM EDT -----  Subject: Refill Request    QUESTIONS  Name of Medication? metFORMIN (GLUCOPHAGE) 500 MG tablet  Patient-reported dosage and instructions? 500 MG twice a day with meal  How many days do you have left? 10  Preferred Pharmacy? [id number]  Pharmacy phone number (if available)? [phone]  ---------------------------------------------------------------------------  --------------  Chinmay TURNER  What is the best way for the office to contact you? Do not leave any   message, patient will call back for answer  Preferred Call Back Phone Number? [phone]  ---------------------------------------------------------------------------  --------------  SCRIPT ANSWERS  Relationship to Patient?  Self

## 2022-10-04 DIAGNOSIS — E13.69 OTHER SPECIFIED DIABETES MELLITUS WITH OTHER SPECIFIED COMPLICATION, UNSPECIFIED WHETHER LONG TERM INSULIN USE (HCC): ICD-10-CM

## 2022-10-04 DIAGNOSIS — I10 HYPERTENSION, UNSPECIFIED TYPE: Primary | ICD-10-CM

## 2022-10-04 RX ORDER — IRBESARTAN 150 MG/1
150 TABLET ORAL DAILY
Qty: 30 TABLET | Refills: 0 | Status: SHIPPED | OUTPATIENT
Start: 2022-10-04 | End: 2022-10-11 | Stop reason: SDUPTHER

## 2022-10-04 RX ORDER — HYDROCHLOROTHIAZIDE 25 MG/1
25 TABLET ORAL DAILY
Qty: 30 TABLET | Refills: 0 | Status: SHIPPED | OUTPATIENT
Start: 2022-10-04 | End: 2022-10-11 | Stop reason: SDUPTHER

## 2022-10-04 RX ORDER — IRBESARTAN 150 MG/1
150 TABLET ORAL DAILY
Qty: 30 TABLET | Refills: 0 | Status: CANCELLED | OUTPATIENT
Start: 2022-10-04

## 2022-10-04 NOTE — TELEPHONE ENCOUNTER
I SCHEDULED PATIENT FOR LABS TOMORROW AND F/U NEXT WEEK HOWEVER HE SAID HE IS OUT OF HIS BP MEDS AND ASKING IF HE CAN GET THEM FILLED TODAY

## 2022-10-11 ENCOUNTER — OFFICE VISIT (OUTPATIENT)
Dept: INTERNAL MEDICINE CLINIC | Facility: CLINIC | Age: 32
End: 2022-10-11
Payer: COMMERCIAL

## 2022-10-11 VITALS
BODY MASS INDEX: 42.38 KG/M2 | WEIGHT: 270 LBS | DIASTOLIC BLOOD PRESSURE: 135 MMHG | HEART RATE: 92 BPM | SYSTOLIC BLOOD PRESSURE: 183 MMHG | HEIGHT: 67 IN

## 2022-10-11 DIAGNOSIS — E78.00 PURE HYPERCHOLESTEROLEMIA, UNSPECIFIED: ICD-10-CM

## 2022-10-11 DIAGNOSIS — E66.01 CLASS 3 SEVERE OBESITY DUE TO EXCESS CALORIES WITH SERIOUS COMORBIDITY AND BODY MASS INDEX (BMI) OF 40.0 TO 44.9 IN ADULT (HCC): ICD-10-CM

## 2022-10-11 DIAGNOSIS — Z11.59 NEED FOR HEPATITIS C SCREENING TEST: ICD-10-CM

## 2022-10-11 DIAGNOSIS — E11.8 TYPE 2 DIABETES MELLITUS WITH UNSPECIFIED COMPLICATIONS (HCC): ICD-10-CM

## 2022-10-11 DIAGNOSIS — I10 ESSENTIAL (PRIMARY) HYPERTENSION: Primary | ICD-10-CM

## 2022-10-11 PROCEDURE — 99214 OFFICE O/P EST MOD 30 MIN: CPT | Performed by: FAMILY MEDICINE

## 2022-10-11 RX ORDER — AMLODIPINE BESYLATE 10 MG/1
10 TABLET ORAL DAILY
Qty: 90 TABLET | Refills: 1 | Status: SHIPPED | OUTPATIENT
Start: 2022-10-11 | End: 2022-10-25 | Stop reason: SDUPTHER

## 2022-10-11 RX ORDER — IRBESARTAN 300 MG/1
300 TABLET ORAL DAILY
Qty: 90 TABLET | Refills: 2 | Status: SHIPPED | OUTPATIENT
Start: 2022-10-11 | End: 2022-10-25 | Stop reason: SDUPTHER

## 2022-10-11 RX ORDER — HYDROCHLOROTHIAZIDE 25 MG/1
25 TABLET ORAL DAILY
Qty: 90 TABLET | Refills: 1 | Status: SHIPPED | OUTPATIENT
Start: 2022-10-11 | End: 2022-10-25 | Stop reason: SDUPTHER

## 2022-10-11 SDOH — ECONOMIC STABILITY: FOOD INSECURITY: WITHIN THE PAST 12 MONTHS, YOU WORRIED THAT YOUR FOOD WOULD RUN OUT BEFORE YOU GOT MONEY TO BUY MORE.: NEVER TRUE

## 2022-10-11 SDOH — ECONOMIC STABILITY: FOOD INSECURITY: WITHIN THE PAST 12 MONTHS, THE FOOD YOU BOUGHT JUST DIDN'T LAST AND YOU DIDN'T HAVE MONEY TO GET MORE.: NEVER TRUE

## 2022-10-11 ASSESSMENT — PATIENT HEALTH QUESTIONNAIRE - PHQ9
SUM OF ALL RESPONSES TO PHQ QUESTIONS 1-9: 0
SUM OF ALL RESPONSES TO PHQ9 QUESTIONS 1 & 2: 0
2. FEELING DOWN, DEPRESSED OR HOPELESS: 0
SUM OF ALL RESPONSES TO PHQ QUESTIONS 1-9: 0
1. LITTLE INTEREST OR PLEASURE IN DOING THINGS: 0
DEPRESSION UNABLE TO ASSESS: PT REFUSES
SUM OF ALL RESPONSES TO PHQ QUESTIONS 1-9: 0
SUM OF ALL RESPONSES TO PHQ QUESTIONS 1-9: 0

## 2022-10-11 ASSESSMENT — SOCIAL DETERMINANTS OF HEALTH (SDOH): HOW HARD IS IT FOR YOU TO PAY FOR THE VERY BASICS LIKE FOOD, HOUSING, MEDICAL CARE, AND HEATING?: NOT HARD AT ALL

## 2022-10-11 NOTE — PROGRESS NOTES
Tab Ordonez (:  1990) is a [address] y.o. male,Established patient, here for evaluation of the following chief complaint(s):  Follow-up, Medication Refill (Patient reporting Amlodipine 5mg PRN), and Hypertension         ASSESSMENT/PLAN:  1. Essential (primary) hypertension  -     hydroCHLOROthiazide (HYDRODIURIL) 25 MG tablet; Take 1 tablet by mouth daily, Disp-90 tablet, R-1Normal  -     irbesartan (AVAPRO) 300 MG tablet; Take 1 tablet by mouth daily, Disp-90 tablet, R-2Normal  -     CBC with Auto Differential; Future  -     Comprehensive Metabolic Panel; Future  -     amLODIPine (NORVASC) 10 MG tablet; Take 1 tablet by mouth daily, Disp-90 tablet, R-1Normal  2. Type 2 diabetes mellitus with unspecified complications (HCC)  -     Comprehensive Metabolic Panel; Future  -     AMB POC URINE, MICROALBUMIN, SEMIQUANT (1 RESULT); Future  -     Hemoglobin A1C; Future  3. Class 3 severe obesity due to excess calories with serious comorbidity and body mass index (BMI) of 40.0 to 44.9 in [facility])  -     Comprehensive Metabolic Panel; Future  -     Lipid Panel; Future  4. Pure hypercholesterolemia, unspecified  5. Need for hepatitis C screening test  -     Hepatitis C Antibody; Future  1. Hypertension uncontrolled because not taking medications. Will restart medications. Patient to monitor blood pressure at home and give us a call if greater than 140/90. Started off with irbesartan and hydrochlorothiazide initially. Had amlodipine if blood pressure greater than 140/90.  2.  Diabetes. Appears to be controlled. We will check hemoglobin A1c.  3.  Obesity. Patient has not lost weight and is not following a diet. Encouraged exercise and diet. 4.  Hyperlipidemia we will recheck lipid profile. 5.  Patient refused immunizations. Return in about 9 days (around 10/20/2022) for ffup with labs prior to visit, video visit. Subjective   SUBJECTIVE/OBJECTIVE:  18-year-old male comes in for medication refill. Has not been seen since June of last year. Patient will be moving to [place] next month. Medical problems include  1. Obesity. No weight gain. increased vegetable intake. Weight stable. Exercise once a week. Not following diet. 2.  Hypertension 2019. Patient taking irbesartan and hydrochlorothiazide and amlodipine. . No cp or sob. Low salt diet. Eats a lot of fast foods. Patient has been off his medications for 2 weeks. Only took hydrochlorothiazide today. Will restart medications. Told to start off with irbesartan and hydrochlorothiazide initially. Can add amlodipine if blood pressure greater than 140/90.  3.  Diabetes 2019. Patient taking Metformin, ARB with no statin, no aspirin. Blood sugars range from . No weight changes noted. Not following a diet. We will repeat blood work. 4.  Migraine headaches resolved with over-the-counter medications. 5.  Hyperlipidemia. Last . Not on any medications. Patient does not desire to go on any medications. We will recheck lipid profile. 6.  Lab work done 6/17/2021 TSH normal.  Hemoglobin A1c 6.5 cholesterol 171 triglycerides 100 HDL 38  CMP with slightly elevated sugar otherwise normal CBC normal      Review of Systems       Objective   Physical Exam  Constitutional:       Appearance: Normal appearance. He is obese. HENT:      Head: Normocephalic. Neck:      Vascular: No carotid bruit. Cardiovascular:      Rate and Rhythm: Normal rate. Heart sounds: Normal heart sounds. Pulmonary:      Effort: Pulmonary effort is normal.      Breath sounds: Normal breath sounds. Abdominal:      General: Abdomen is flat. Bowel sounds are normal.      Palpations: Abdomen is soft. Musculoskeletal:      Cervical back: No tenderness. Right lower leg: No edema. Left lower leg: No edema. Comments: Normal DP pulses, normal monofilament test.   Lymphadenopathy:      Cervical: No cervical adenopathy.    Neurological:      Mental Status: He is alert. Psychiatric:         Mood and Affect: Mood normal.                An electronic signature was used to authenticate this note.     --Ye Mckee MD

## 2022-10-13 DIAGNOSIS — I10 ESSENTIAL (PRIMARY) HYPERTENSION: ICD-10-CM

## 2022-10-13 DIAGNOSIS — Z11.59 NEED FOR HEPATITIS C SCREENING TEST: ICD-10-CM

## 2022-10-13 DIAGNOSIS — E66.01 CLASS 3 SEVERE OBESITY DUE TO EXCESS CALORIES WITH SERIOUS COMORBIDITY AND BODY MASS INDEX (BMI) OF 40.0 TO 44.9 IN ADULT (HCC): ICD-10-CM

## 2022-10-13 DIAGNOSIS — E11.8 TYPE 2 DIABETES MELLITUS WITH UNSPECIFIED COMPLICATIONS (HCC): ICD-10-CM

## 2022-10-13 LAB
BASOPHILS # BLD: 0 K/UL (ref 0–0.2)
BASOPHILS NFR BLD: 1 % (ref 0–2)
DIFFERENTIAL METHOD BLD: ABNORMAL
EOSINOPHIL # BLD: 0.1 K/UL (ref 0–0.8)
EOSINOPHIL NFR BLD: 1 % (ref 0.5–7.8)
ERYTHROCYTE [DISTWIDTH] IN BLOOD BY AUTOMATED COUNT: 14 % (ref 11.9–14.6)
EST. AVERAGE GLUCOSE BLD GHB EST-MCNC: 128 MG/DL
HBA1C MFR BLD: 6.1 % (ref 4.8–5.6)
HCT VFR BLD AUTO: 52.8 % (ref 41.1–50.3)
HGB BLD-MCNC: 17 G/DL (ref 13.6–17.2)
IMM GRANULOCYTES # BLD AUTO: 0 K/UL (ref 0–0.5)
IMM GRANULOCYTES NFR BLD AUTO: 0 % (ref 0–5)
LYMPHOCYTES # BLD: 3.7 K/UL (ref 0.5–4.6)
LYMPHOCYTES NFR BLD: 53 % (ref 13–44)
MCH RBC QN AUTO: 28 PG (ref 26.1–32.9)
MCHC RBC AUTO-ENTMCNC: 32.2 G/DL (ref 31.4–35)
MCV RBC AUTO: 86.8 FL (ref 82–102)
MONOCYTES # BLD: 0.5 K/UL (ref 0.1–1.3)
MONOCYTES NFR BLD: 8 % (ref 4–12)
NEUTS SEG # BLD: 2.6 K/UL (ref 1.7–8.2)
NEUTS SEG NFR BLD: 37 % (ref 43–78)
NRBC # BLD: 0 K/UL (ref 0–0.2)
PLATELET # BLD AUTO: 280 K/UL (ref 150–450)
PMV BLD AUTO: 10.5 FL (ref 9.4–12.3)
RBC # BLD AUTO: 6.08 M/UL (ref 4.23–5.6)
WBC # BLD AUTO: 7 K/UL (ref 4.3–11.1)

## 2022-10-14 LAB
ALBUMIN SERPL-MCNC: 4.3 G/DL (ref 3.5–5)
ALBUMIN/GLOB SERPL: 1.2 {RATIO} (ref 0.4–1.6)
ALP SERPL-CCNC: 80 U/L (ref 50–136)
ALT SERPL-CCNC: 34 U/L (ref 12–65)
ANION GAP SERPL CALC-SCNC: 3 MMOL/L (ref 2–11)
AST SERPL-CCNC: 14 U/L (ref 15–37)
BILIRUB SERPL-MCNC: 0.7 MG/DL (ref 0.2–1.1)
BUN SERPL-MCNC: 16 MG/DL (ref 6–23)
CALCIUM SERPL-MCNC: 9.4 MG/DL (ref 8.3–10.4)
CHLORIDE SERPL-SCNC: 104 MMOL/L (ref 101–110)
CHOLEST SERPL-MCNC: 188 MG/DL
CO2 SERPL-SCNC: 28 MMOL/L (ref 21–32)
CREAT SERPL-MCNC: 1.5 MG/DL (ref 0.8–1.5)
GLOBULIN SER CALC-MCNC: 3.5 G/DL (ref 2.8–4.5)
GLUCOSE SERPL-MCNC: 82 MG/DL (ref 65–100)
HCV AB SER QL: NONREACTIVE
HDLC SERPL-MCNC: 38 MG/DL (ref 40–60)
HDLC SERPL: 4.9 {RATIO}
LDLC SERPL CALC-MCNC: 133 MG/DL
POTASSIUM SERPL-SCNC: 4.2 MMOL/L (ref 3.5–5.1)
PROT SERPL-MCNC: 7.8 G/DL (ref 6.3–8.2)
SODIUM SERPL-SCNC: 135 MMOL/L (ref 133–143)
TRIGL SERPL-MCNC: 85 MG/DL (ref 35–150)
VLDLC SERPL CALC-MCNC: 17 MG/DL (ref 6–23)

## 2022-10-25 ENCOUNTER — TELEMEDICINE (OUTPATIENT)
Dept: INTERNAL MEDICINE CLINIC | Facility: CLINIC | Age: 32
End: 2022-10-25
Payer: COMMERCIAL

## 2022-10-25 DIAGNOSIS — I10 ESSENTIAL (PRIMARY) HYPERTENSION: Primary | ICD-10-CM

## 2022-10-25 DIAGNOSIS — E78.00 PURE HYPERCHOLESTEROLEMIA, UNSPECIFIED: ICD-10-CM

## 2022-10-25 DIAGNOSIS — E11.8 TYPE 2 DIABETES MELLITUS WITH UNSPECIFIED COMPLICATIONS (HCC): ICD-10-CM

## 2022-10-25 PROCEDURE — 99443 PR PHYS/QHP TELEPHONE EVALUATION 21-30 MIN: CPT | Performed by: FAMILY MEDICINE

## 2022-10-25 RX ORDER — HYDROCHLOROTHIAZIDE 25 MG/1
25 TABLET ORAL DAILY
Qty: 90 TABLET | Refills: 1 | Status: SHIPPED | OUTPATIENT
Start: 2022-10-25

## 2022-10-25 RX ORDER — AMLODIPINE BESYLATE 10 MG/1
10 TABLET ORAL DAILY
Qty: 90 TABLET | Refills: 1 | Status: SHIPPED | OUTPATIENT
Start: 2022-10-25

## 2022-10-25 RX ORDER — IRBESARTAN 300 MG/1
300 TABLET ORAL DAILY
Qty: 90 TABLET | Refills: 2 | Status: SHIPPED | OUTPATIENT
Start: 2022-10-25

## 2022-10-25 ASSESSMENT — PATIENT HEALTH QUESTIONNAIRE - PHQ9
SUM OF ALL RESPONSES TO PHQ QUESTIONS 1-9: 0
2. FEELING DOWN, DEPRESSED OR HOPELESS: 0
SUM OF ALL RESPONSES TO PHQ QUESTIONS 1-9: 0
SUM OF ALL RESPONSES TO PHQ QUESTIONS 1-9: 0
SUM OF ALL RESPONSES TO PHQ9 QUESTIONS 1 & 2: 0
DEPRESSION UNABLE TO ASSESS: PT REFUSES
SUM OF ALL RESPONSES TO PHQ QUESTIONS 1-9: 0
1. LITTLE INTEREST OR PLEASURE IN DOING THINGS: 0

## 2022-10-25 NOTE — PROGRESS NOTES
Blake Wells is a 27 y.o. male evaluated via telephone on 10/25/2022 for Follow-up and Discuss Labs  . 47-year-old male comes in for ffup of lab work. Patient will be moving to [place] next month. Medical problems include  1. Obesity. No weight gain. increased vegetable intake. Weight stable. Exercise once a week. Not following diet. 2.  Hypertension 2019. Patient taking irbesartan and hydrochlorothiazide and amlodipine. . No cp or sob. Low salt diet. Eats a lot of fast foods. Patient has been off his medications for 2 weeks. Only took hydrochlorothiazide today. Will restart medications. Told to start off with irbesartan and hydrochlorothiazide initially. Can add amlodipine if blood pressure greater than 140/90. Bp at home 150/113.  3.  Diabetes 2019. Patient taking Metformin, ARB with no statin, no aspirin. Blood sugars range from . No weight changes noted. Not following a diet. We will repeat blood work. Hemoglobin A1c 6.1.  4.  Migraine headaches resolved with over-the-counter medications. 5.  Hyperlipidemia. Last . Not on any medications. Patient does not desire to go on any medications. We will recheck lipid profile.   6.  Lab work done 6/17/2021 TSH normal.  Hemoglobin A1c 6.5 cholesterol 171 triglycerides 100 HDL 38  CMP with slightly elevated sugar otherwise normal CBC normal  Lab work done on 10/13/2022 CBC essentially normal CMP within normal limits cholesterol 188 HDL 38  hemoglobin A1c 6.1 hepatitis C negative      Orders Only on 10/13/2022   Component Date Value Ref Range Status    WBC 10/13/2022 7.0  4.3 - 11.1 K/uL Final    RBC 10/13/2022 6.08 (A)  4.23 - 5.6 M/uL Final    Hemoglobin 10/13/2022 17.0  13.6 - 17.2 g/dL Final    Hematocrit 10/13/2022 52.8 (A)  41.1 - 50.3 % Final    MCV 10/13/2022 86.8  82 - 102 FL Final    MCH 10/13/2022 28.0  26.1 - 32.9 PG Final    MCHC 10/13/2022 32.2  31.4 - 35.0 g/dL Final    RDW 10/13/2022 14.0  11.9 - 14.6 % Final    Platelets 10/89/7838 280  150 - 450 K/uL Final    MPV 10/13/2022 10.5  9.4 - 12.3 FL Final    nRBC 10/13/2022 0.00  0.0 - 0.2 K/uL Final    **Note: Absolute NRBC parameter is now reported with Hemogram**    Differential Type 10/13/2022 AUTOMATED    Final    Seg Neutrophils 10/13/2022 37 (A)  43 - 78 % Final    Lymphocytes 10/13/2022 53 (A)  13 - 44 % Final    Monocytes 10/13/2022 8  4.0 - 12.0 % Final    Eosinophils % 10/13/2022 1  0.5 - 7.8 % Final    Basophils 10/13/2022 1  0.0 - 2.0 % Final    Immature Granulocytes 10/13/2022 0  0.0 - 5.0 % Final    Segs Absolute 10/13/2022 2.6  1.7 - 8.2 K/UL Final    Absolute Lymph # 10/13/2022 3.7  0.5 - 4.6 K/UL Final    Absolute Mono # 10/13/2022 0.5  0.1 - 1.3 K/UL Final    Absolute Eos # 10/13/2022 0.1  0.0 - 0.8 K/UL Final    Basophils Absolute 10/13/2022 0.0  0.0 - 0.2 K/UL Final    Absolute Immature Granulocyte 10/13/2022 0.0  0.0 - 0.5 K/UL Final    Sodium 10/13/2022 135  133 - 143 mmol/L Final    Potassium 10/13/2022 4.2  3.5 - 5.1 mmol/L Final    Chloride 10/13/2022 104  101 - 110 mmol/L Final    CO2 10/13/2022 28  21 - 32 mmol/L Final    Anion Gap 10/13/2022 3  2 - 11 mmol/L Final    Glucose 10/13/2022 82  65 - 100 mg/dL Final    BUN 10/13/2022 16  6 - 23 MG/DL Final    Creatinine 10/13/2022 1.50  0.8 - 1.5 MG/DL Final    Est, Glom Filt Rate 10/13/2022 >60  >60 ml/min/1.73m2 Final    Comment:   Pediatric calculator link: Alber.at. org/professionals/kdoqi/gfr_calculatorped    Effective Oct 3, 2022    These results are not intended for use in patients <25years of age. eGFR results are calculated without a race factor using  the 2021 CKD-EPI equation. Careful clinical correlation is recommended, particularly when comparing to results calculated using previous equations.   The CKD-EPI equation is less accurate in patients with extremes of muscle mass, extra-renal metabolism of creatinine, excessive creatine ingestion, or following therapy that affects renal tubular secretion. Calcium 10/13/2022 9.4  8.3 - 10.4 MG/DL Final    Total Bilirubin 10/13/2022 0.7  0.2 - 1.1 MG/DL Final    ALT 10/13/2022 34  12 - 65 U/L Final    AST 10/13/2022 14 (A)  15 - 37 U/L Final    Alk Phosphatase 10/13/2022 80  50 - 136 U/L Final    Total Protein 10/13/2022 7.8  6.3 - 8.2 g/dL Final    Albumin 10/13/2022 4.3  3.5 - 5.0 g/dL Final    Globulin 10/13/2022 3.5  2.8 - 4.5 g/dL Final    Albumin/Globulin Ratio 10/13/2022 1.2  0.4 - 1.6   Final    Cholesterol, Total 10/13/2022 188  <200 MG/DL Final    Comment: Borderline High: 200-239 mg/dL  High: Greater than or equal to 240 mg/dL      Triglycerides 10/13/2022 85  35 - 150 MG/DL Final    Comment: Borderline High: 150-199 mg/dL, High: 200-499 mg/dL  Very High: Greater than or equal to 500 mg/dL      HDL 10/13/2022 38 (A)  40 - 60 MG/DL Final    LDL Calculated 10/13/2022 133 (A)  <100 MG/DL Final    Comment: Near Optimal: 100-129 mg/dL  Borderline High: 130-159, High: 160-189 mg/dL  Very High: Greater than or equal to 190 mg/dL      VLDL Cholesterol Calculated 10/13/2022 17  6.0 - 23.0 MG/DL Final    Chol/HDL Ratio 10/13/2022 4.9    Final    Hemoglobin A1C 10/13/2022 6.1 (A)  4.8 - 5.6 % Final    eAG 10/13/2022 128  mg/dL Final    Comment: Reference Range  Normal: 4.8-5.6  Diabetic >=6.5%  Normal       <5.7%      Hepatitis C Ab 10/13/2022 NONREACTIVE  NONREACTIVE   Final       Documentation:  I communicated with the patient and/or health care decision maker about needing to take his blood pressure medications daily, diabetes well controlled, cholesterol elevated. Details of this discussion including any medical advice provided: to patient. Total Time: minutes: 21-30 minutes    Roby Rogers was evaluated through a synchronous (real-time) audio encounter. Patient identification was verified at the start of the visit.  He (or guardian if applicable) is aware that this is a billable service, which includes applicable co-pays. This visit was conducted with the patient's (and/or legal guardian's) verbal consent. He has not had a related appointment within my department in the past 7 days or scheduled within the next 24 hours. The patient was located at Home: [address] [place] [address]. The provider was located at [facility] (Appt Dept): [address] [facility],  [address] [address] Note: not billable if this call serves to triage the patient into an appointment for the relevant concern    Nesha Alaniz MD     Diagnosis Orders   1. Essential (primary) hypertension  irbesartan (AVAPRO) 300 MG tablet    hydroCHLOROthiazide (HYDRODIURIL) 25 MG tablet    amLODIPine (NORVASC) 10 MG tablet      2. Type 2 diabetes mellitus with unspecified complications (HCC)  metFORMIN (GLUCOPHAGE) 500 MG tablet      3. Pure hypercholesterolemia, unspecified        1. Hypertension uncontrolled because not taking medications regularly. Advised need for compliance. Prescriptions refilled for irbesartan hydrochlorothiazide and amlodipine. 2.  Type 2 diabetes. Patient on metformin. Hemoglobin A1c well controlled. 3.  Hyperlipidemia. Cholesterol elevated. Would suggest starting a statin. Patient moving to [address] [place] [address]. Told to get set up with pcp and give [facility] a call so that we can send records.